# Patient Record
Sex: MALE | Race: WHITE | Employment: OTHER | URBAN - METROPOLITAN AREA
[De-identification: names, ages, dates, MRNs, and addresses within clinical notes are randomized per-mention and may not be internally consistent; named-entity substitution may affect disease eponyms.]

---

## 2018-09-14 ENCOUNTER — APPOINTMENT (OUTPATIENT)
Dept: GENERAL RADIOLOGY | Facility: HOSPITAL | Age: 58
DRG: 195 | End: 2018-09-14
Attending: EMERGENCY MEDICINE

## 2018-09-14 ENCOUNTER — HOSPITAL ENCOUNTER (INPATIENT)
Facility: HOSPITAL | Age: 58
LOS: 2 days | Discharge: HOME | DRG: 195 | End: 2018-09-16
Attending: EMERGENCY MEDICINE | Admitting: HOSPITALIST

## 2018-09-14 DIAGNOSIS — J18.9 COMMUNITY ACQUIRED PNEUMONIA OF LEFT LOWER LOBE OF LUNG: Primary | ICD-10-CM

## 2018-09-14 LAB
ALBUMIN SERPL-MCNC: 3.3 G/DL (ref 3.5–4.8)
ALBUMIN/GLOB SERPL: 0.9 {RATIO} (ref 1–2)
ALP LIVER SERPL-CCNC: 30 U/L (ref 45–117)
ALT SERPL-CCNC: 87 U/L (ref 17–63)
ANION GAP SERPL CALC-SCNC: 8 MMOL/L (ref 0–18)
AST SERPL-CCNC: 28 U/L (ref 15–41)
BASOPHILS # BLD AUTO: 0.03 X10(3) UL (ref 0–0.1)
BASOPHILS NFR BLD AUTO: 0.4 %
BILIRUB SERPL-MCNC: 0.4 MG/DL (ref 0.1–2)
BUN BLD-MCNC: 15 MG/DL (ref 8–20)
BUN/CREAT SERPL: 15.6 (ref 10–20)
CALCIUM BLD-MCNC: 8.7 MG/DL (ref 8.3–10.3)
CHLORIDE SERPL-SCNC: 106 MMOL/L (ref 101–111)
CO2 SERPL-SCNC: 25 MMOL/L (ref 22–32)
CREAT BLD-MCNC: 0.96 MG/DL (ref 0.7–1.3)
EOSINOPHIL # BLD AUTO: 0.07 X10(3) UL (ref 0–0.3)
EOSINOPHIL NFR BLD AUTO: 0.9 %
ERYTHROCYTE [DISTWIDTH] IN BLOOD BY AUTOMATED COUNT: 13.8 % (ref 11.5–16)
GLOBULIN PLAS-MCNC: 3.8 G/DL (ref 2.5–4)
GLUCOSE BLD-MCNC: 177 MG/DL (ref 70–99)
HCT VFR BLD AUTO: 43.1 % (ref 37–53)
HGB BLD-MCNC: 14.5 G/DL (ref 13–17)
IMMATURE GRANULOCYTE COUNT: 0.07 X10(3) UL (ref 0–1)
IMMATURE GRANULOCYTE RATIO %: 0.9 %
LACTIC ACID: 2.7 MMOL/L (ref 0.5–2)
LYMPHOCYTES # BLD AUTO: 2.25 X10(3) UL (ref 0.9–4)
LYMPHOCYTES NFR BLD AUTO: 29.1 %
M PROTEIN MFR SERPL ELPH: 7.1 G/DL (ref 6.1–8.3)
MCH RBC QN AUTO: 30.6 PG (ref 27–33.2)
MCHC RBC AUTO-ENTMCNC: 33.6 G/DL (ref 31–37)
MCV RBC AUTO: 90.9 FL (ref 80–99)
MONOCYTES # BLD AUTO: 1.04 X10(3) UL (ref 0.1–1)
MONOCYTES NFR BLD AUTO: 13.5 %
NEUTROPHIL ABS PRELIM: 4.26 X10 (3) UL (ref 1.3–6.7)
NEUTROPHILS # BLD AUTO: 4.26 X10(3) UL (ref 1.3–6.7)
NEUTROPHILS NFR BLD AUTO: 55.2 %
OSMOLALITY SERPL CALC.SUM OF ELEC: 293 MOSM/KG (ref 275–295)
PLATELET # BLD AUTO: 222 10(3)UL (ref 150–450)
POTASSIUM SERPL-SCNC: 4.2 MMOL/L (ref 3.6–5.1)
RBC # BLD AUTO: 4.74 X10(6)UL (ref 4.3–5.7)
RED CELL DISTRIBUTION WIDTH-SD: 45.8 FL (ref 35.1–46.3)
SODIUM SERPL-SCNC: 139 MMOL/L (ref 136–144)
WBC # BLD AUTO: 7.7 X10(3) UL (ref 4–13)

## 2018-09-14 PROCEDURE — 99223 1ST HOSP IP/OBS HIGH 75: CPT | Performed by: INTERNAL MEDICINE

## 2018-09-14 PROCEDURE — 71045 X-RAY EXAM CHEST 1 VIEW: CPT | Performed by: EMERGENCY MEDICINE

## 2018-09-14 RX ORDER — SODIUM CHLORIDE 9 MG/ML
INJECTION, SOLUTION INTRAVENOUS CONTINUOUS
Status: DISCONTINUED | OUTPATIENT
Start: 2018-09-14 | End: 2018-09-15

## 2018-09-14 RX ORDER — PREDNISONE 10 MG/1
10 TABLET ORAL DAILY
Status: ON HOLD | COMMUNITY
Start: 2018-09-12 | End: 2018-09-16

## 2018-09-14 RX ORDER — ACETAMINOPHEN 325 MG/1
650 TABLET ORAL EVERY 6 HOURS PRN
Status: DISCONTINUED | OUTPATIENT
Start: 2018-09-14 | End: 2018-09-16

## 2018-09-14 RX ORDER — METOCLOPRAMIDE HYDROCHLORIDE 5 MG/ML
10 INJECTION INTRAMUSCULAR; INTRAVENOUS EVERY 8 HOURS PRN
Status: DISCONTINUED | OUTPATIENT
Start: 2018-09-14 | End: 2018-09-16

## 2018-09-14 RX ORDER — HEPARIN SODIUM 5000 [USP'U]/ML
5000 INJECTION, SOLUTION INTRAVENOUS; SUBCUTANEOUS EVERY 8 HOURS SCHEDULED
Status: DISCONTINUED | OUTPATIENT
Start: 2018-09-14 | End: 2018-09-16

## 2018-09-14 RX ORDER — ONDANSETRON 2 MG/ML
4 INJECTION INTRAMUSCULAR; INTRAVENOUS EVERY 6 HOURS PRN
Status: DISCONTINUED | OUTPATIENT
Start: 2018-09-14 | End: 2018-09-16

## 2018-09-15 LAB
ANION GAP SERPL CALC-SCNC: 4 MMOL/L (ref 0–18)
ATRIAL RATE: 82 BPM
BASOPHILS # BLD AUTO: 0.03 X10(3) UL (ref 0–0.1)
BASOPHILS NFR BLD AUTO: 0.5 %
BILIRUB UR QL STRIP.AUTO: NEGATIVE
BUN BLD-MCNC: 13 MG/DL (ref 8–20)
BUN/CREAT SERPL: 16.7 (ref 10–20)
CALCIUM BLD-MCNC: 7.5 MG/DL (ref 8.3–10.3)
CHLORIDE SERPL-SCNC: 113 MMOL/L (ref 101–111)
CLARITY UR REFRACT.AUTO: CLEAR
CO2 SERPL-SCNC: 27 MMOL/L (ref 22–32)
CREAT BLD-MCNC: 0.78 MG/DL (ref 0.7–1.3)
EOSINOPHIL # BLD AUTO: 0.11 X10(3) UL (ref 0–0.3)
EOSINOPHIL NFR BLD AUTO: 1.8 %
ERYTHROCYTE [DISTWIDTH] IN BLOOD BY AUTOMATED COUNT: 13.7 % (ref 11.5–16)
GLUCOSE BLD-MCNC: 93 MG/DL (ref 70–99)
GLUCOSE UR STRIP.AUTO-MCNC: NEGATIVE MG/DL
HCT VFR BLD AUTO: 38.2 % (ref 37–53)
HGB BLD-MCNC: 12.5 G/DL (ref 13–17)
IMMATURE GRANULOCYTE COUNT: 0.06 X10(3) UL (ref 0–1)
IMMATURE GRANULOCYTE RATIO %: 1 %
KETONES UR STRIP.AUTO-MCNC: NEGATIVE MG/DL
LACTIC ACID: 1.7 MMOL/L (ref 0.5–2)
LACTIC ACID: 1.8 MMOL/L (ref 0.5–2)
LEUKOCYTE ESTERASE UR QL STRIP.AUTO: NEGATIVE
LYMPHOCYTES # BLD AUTO: 2.17 X10(3) UL (ref 0.9–4)
LYMPHOCYTES NFR BLD AUTO: 35.7 %
MCH RBC QN AUTO: 30.3 PG (ref 27–33.2)
MCHC RBC AUTO-ENTMCNC: 32.7 G/DL (ref 31–37)
MCV RBC AUTO: 92.5 FL (ref 80–99)
MONOCYTES # BLD AUTO: 0.73 X10(3) UL (ref 0.1–1)
MONOCYTES NFR BLD AUTO: 12 %
NEUTROPHIL ABS PRELIM: 2.97 X10 (3) UL (ref 1.3–6.7)
NEUTROPHILS # BLD AUTO: 2.97 X10(3) UL (ref 1.3–6.7)
NEUTROPHILS NFR BLD AUTO: 49 %
NITRITE UR QL STRIP.AUTO: NEGATIVE
OSMOLALITY SERPL CALC.SUM OF ELEC: 298 MOSM/KG (ref 275–295)
P AXIS: 24 DEGREES
P-R INTERVAL: 128 MS
PH UR STRIP.AUTO: 5 [PH] (ref 4.5–8)
PLATELET # BLD AUTO: 173 10(3)UL (ref 150–450)
POTASSIUM SERPL-SCNC: 3.9 MMOL/L (ref 3.6–5.1)
PROT UR STRIP.AUTO-MCNC: NEGATIVE MG/DL
Q-T INTERVAL: 352 MS
QRS DURATION: 68 MS
QTC CALCULATION (BEZET): 411 MS
R AXIS: 11 DEGREES
RBC # BLD AUTO: 4.13 X10(6)UL (ref 4.3–5.7)
RBC UR QL AUTO: NEGATIVE
RED CELL DISTRIBUTION WIDTH-SD: 46.5 FL (ref 35.1–46.3)
SODIUM SERPL-SCNC: 144 MMOL/L (ref 136–144)
SP GR UR STRIP.AUTO: 1.01 (ref 1–1.03)
T AXIS: 28 DEGREES
UROBILINOGEN UR STRIP.AUTO-MCNC: <2 MG/DL
VENTRICULAR RATE: 82 BPM
WBC # BLD AUTO: 6.1 X10(3) UL (ref 4–13)

## 2018-09-15 PROCEDURE — 99232 SBSQ HOSP IP/OBS MODERATE 35: CPT | Performed by: STUDENT IN AN ORGANIZED HEALTH CARE EDUCATION/TRAINING PROGRAM

## 2018-09-15 RX ORDER — SODIUM CHLORIDE 9 MG/ML
INJECTION, SOLUTION INTRAVENOUS CONTINUOUS
Status: DISCONTINUED | OUTPATIENT
Start: 2018-09-15 | End: 2018-09-16

## 2018-09-15 NOTE — PROGRESS NOTES
TOM HOSPITALIST  Progress Note     Roe Meyer Patient Status:  Inpatient    10/13/1960 MRN [id number]   [facility] 5NW-A Attending Marko Dykes MD   Hosp Day # 1 PCP None Pcp     Chief Complaint: PNA    S: Patient  Has dry cough, CT- discussed w /pt, recommend repeat CT as OP in 2-4 weeks after resolution of PNA for better visualization     Plan of care:   Continue abx  Possible d/c in AM     Quality:  · DVT Prophylaxis: Heparin   · CODE status: full  · Zuniga: no  · Central line: n

## 2018-09-15 NOTE — H&P
TOM HOSPITALIST  History and Physical     Deyanira Bui Patient Status:  Emergency    10/13/1960 MRN [id number]   Location [address] [facility] Attending Karrin Lefort, MD   Hosp Day # 0 PCP None Pcp     Chief Complaint: PNA murmurs, rubs or gallops. Equal pulses. Chest and Back: No tenderness or deformity. Abdomen: Soft, nontender, nondistended. Positive bowel sounds. No rebound, guarding or organomegaly. Neurologic: No focal neurological deficits.  CNII-XII grossly intac

## 2018-09-15 NOTE — ED INITIAL ASSESSMENT (HPI)
Pt to ED with c/o pneumonia x 1 month. Pt had CT scan on Tuesday and was instructed to go to the ER today by his doctor.

## 2018-09-15 NOTE — PROGRESS NOTES
NURSING ADMISSION NOTE      Patient admitted via Cart  Oriented to room 525. Safety precautions initiated. Bed in low position. Call light in reach.

## 2018-09-15 NOTE — ED PROVIDER NOTES
Patient Seen in: [facility] Emergency Department    History   Patient presents with:  Pneumonia    Stated Complaint: diagnosed with pneumonia at immediate care - instructed to come to the ED for a*    HPI    Patient is a 28-year-old male comes emerg 09/14/18 2104 (!) 145/91   Pulse 09/14/18 2104 81   Resp 09/14/18 2104 22   Temp 09/14/18 2104 97.7 °F (36.5 °C)   Temp src 09/14/18 2301 Oral   SpO2 09/14/18 2104 97 %   O2 Device 09/14/18 2104 None (Room air)       Current:/81 (BP Location: Left ar PLATELET.   Procedure                               Abnormality         Status                     ---------                               -----------         ------                     CBC W/ DIFFERENTIAL[[id number]]          Abnormal            Final resul CONCLUSION:  1. Stable findings when compared to  image from CT of the chest performed at outside institution dated 9/11/2018. This report was communicated by telephone to ED MD, Dr. Toña Joaquin at the dictation time shown below.       Dictated by: [place]

## 2018-09-16 VITALS
HEART RATE: 63 BPM | HEIGHT: 64 IN | TEMPERATURE: 99 F | BODY MASS INDEX: 30.73 KG/M2 | SYSTOLIC BLOOD PRESSURE: 110 MMHG | DIASTOLIC BLOOD PRESSURE: 53 MMHG | RESPIRATION RATE: 18 BRPM | OXYGEN SATURATION: 97 % | WEIGHT: 180 LBS

## 2018-09-16 LAB
BASOPHILS # BLD AUTO: 0.05 X10(3) UL (ref 0–0.1)
BASOPHILS NFR BLD AUTO: 0.8 %
EOSINOPHIL # BLD AUTO: 0.18 X10(3) UL (ref 0–0.3)
EOSINOPHIL NFR BLD AUTO: 2.9 %
ERYTHROCYTE [DISTWIDTH] IN BLOOD BY AUTOMATED COUNT: 13.6 % (ref 11.5–16)
HCT VFR BLD AUTO: 38.4 % (ref 37–53)
HGB BLD-MCNC: 12.7 G/DL (ref 13–17)
IMMATURE GRANULOCYTE COUNT: 0.05 X10(3) UL (ref 0–1)
IMMATURE GRANULOCYTE RATIO %: 0.8 %
LYMPHOCYTES # BLD AUTO: 1.59 X10(3) UL (ref 0.9–4)
LYMPHOCYTES NFR BLD AUTO: 25.9 %
MCH RBC QN AUTO: 30.5 PG (ref 27–33.2)
MCHC RBC AUTO-ENTMCNC: 33.1 G/DL (ref 31–37)
MCV RBC AUTO: 92.3 FL (ref 80–99)
MONOCYTES # BLD AUTO: 0.77 X10(3) UL (ref 0.1–1)
MONOCYTES NFR BLD AUTO: 12.5 %
NEUTROPHIL ABS PRELIM: 3.5 X10 (3) UL (ref 1.3–6.7)
NEUTROPHILS # BLD AUTO: 3.5 X10(3) UL (ref 1.3–6.7)
NEUTROPHILS NFR BLD AUTO: 57.1 %
PLATELET # BLD AUTO: 168 10(3)UL (ref 150–450)
RBC # BLD AUTO: 4.16 X10(6)UL (ref 4.3–5.7)
RED CELL DISTRIBUTION WIDTH-SD: 46.5 FL (ref 35.1–46.3)
WBC # BLD AUTO: 6.1 X10(3) UL (ref 4–13)

## 2018-09-16 PROCEDURE — 99238 HOSP IP/OBS DSCHRG MGMT 30/<: CPT | Performed by: STUDENT IN AN ORGANIZED HEALTH CARE EDUCATION/TRAINING PROGRAM

## 2018-09-16 RX ORDER — AMOXICILLIN AND CLAVULANATE POTASSIUM 875; 125 MG/1; MG/1
1 TABLET, FILM COATED ORAL 2 TIMES DAILY
Qty: 10 TABLET | Refills: 0 | Status: SHIPPED | OUTPATIENT
Start: 2018-09-16 | End: 2018-09-21

## 2018-09-16 NOTE — PLAN OF CARE
Patient/Family Goals    • Patient/Family Long Term Goal Progressing    • Patient/Family Short Term Goal Progressing        Pt alert and oriented. O2 is WNL on ra. No c/o pain. IV abx infusing. Refusing heparin. Up ad aicha.   Will continue with plan of ca

## 2018-09-16 NOTE — PLAN OF CARE
Patient/Family Short Term Goal Progressing    Assumed care at 0000. Resting in bed apparently sleeping. Walked in the halls x 1. Denies pain. IV infusing with intermittent antibiotic as ordered see MAR. Discussed plan of care. 0600 Refusing heparin shot.  C

## 2018-09-16 NOTE — PLAN OF CARE
Patient/Family Goals    • Patient/Family Long Term Goal Adequate for Discharge    • Patient/Family Short Term Goal Adequate for Discharge

## 2018-09-16 NOTE — DISCHARGE SUMMARY
[facility] HOSPITALIST  DISCHARGE SUMMARY     Alex Diallo Patient Status:  Inpatient    10/13/1960 MRN [id number]   [facility] 5NW-A Attending Earlene Polk MD   [facility] Day # 2 PCP None Pcp     Date of Admission: 2018  Date of Discharge 10 tablet  Refills:  0        STOP taking these medications    predniSONE 10 MG Tabs  Commonly known as:  Aaron Dykes              Where to Get Your Medications      Please  your prescriptions at the location directed by your doctor or nurse    Bring

## 2018-09-24 ENCOUNTER — OFFICE VISIT (OUTPATIENT)
Dept: FAMILY MEDICINE CLINIC | Facility: CLINIC | Age: 58
End: 2018-09-24

## 2018-09-24 VITALS
HEART RATE: 98 BPM | SYSTOLIC BLOOD PRESSURE: 124 MMHG | WEIGHT: 186 LBS | DIASTOLIC BLOOD PRESSURE: 72 MMHG | TEMPERATURE: 99 F | HEIGHT: 64.17 IN | OXYGEN SATURATION: 95 % | BODY MASS INDEX: 31.76 KG/M2

## 2018-09-24 DIAGNOSIS — J18.9 COMMUNITY ACQUIRED PNEUMONIA OF LEFT LOWER LOBE OF LUNG: Primary | ICD-10-CM

## 2018-09-24 PROCEDURE — 99203 OFFICE O/P NEW LOW 30 MIN: CPT | Performed by: FAMILY MEDICINE

## 2018-09-24 NOTE — PATIENT INSTRUCTIONS
-- lungs sound good  -- continue probiotics for at least 3-4 wks  -- would expect slow continued improvement  -- repeat CT scan in 3-4 wks  -- followup here after that, sooner if not improving or worsening

## 2018-09-24 NOTE — PROGRESS NOTES
Oren Chicas is a 59year old male here for Patient presents with:  Hospital F/U: [facility] for Pneumonia 09/14/2018. The patient feels foggy       HPI:       1.  Community acquired pneumonia of left lower lobe of lung ([address]  -here for hospital fol of left lower lobe of lung (HCC)/ ? Lung mass  -improving  -lungs clear today  -continue probiotics  -repeat CT CHEST (CPT=71250); Future in 3-4 wks  -f/u here after that          The patient is asked to return in 1 month.     Orders This Visit:  No orders

## 2018-10-12 ENCOUNTER — OFFICE VISIT (OUTPATIENT)
Dept: FAMILY MEDICINE CLINIC | Facility: CLINIC | Age: 58
End: 2018-10-12

## 2018-10-12 VITALS
TEMPERATURE: 98 F | OXYGEN SATURATION: 98 % | HEART RATE: 79 BPM | BODY MASS INDEX: 32.27 KG/M2 | WEIGHT: 189 LBS | SYSTOLIC BLOOD PRESSURE: 122 MMHG | DIASTOLIC BLOOD PRESSURE: 76 MMHG | HEIGHT: 64.17 IN

## 2018-10-12 DIAGNOSIS — J18.9 COMMUNITY ACQUIRED PNEUMONIA OF LEFT LOWER LOBE OF LUNG: Primary | ICD-10-CM

## 2018-10-12 PROCEDURE — 99213 OFFICE O/P EST LOW 20 MIN: CPT | Performed by: FAMILY MEDICINE

## 2018-10-12 NOTE — PATIENT INSTRUCTIONS
-- call if symptoms worsening  -- otherwise, plan to repeat CT scan 1-2 wks before eligibility period is over for new insurance  -- followup as needed

## 2018-10-21 NOTE — PROGRESS NOTES
Roe Meyer is a 59year old male here for Patient presents with:  Test Results: room 1 MM      HPI:       1.  Community acquired pneumonia of left lower lobe of lung ([facility] [address]  -reviewed results of recent CT scan  -consolidation has decreased in size by ab more time to see resolution on imaging  -as clinically continues to improve  -low threshold to restart abx treatment if develops worsening symptoms - reviewed warning signs  -recehck CT in 2 months  -followup after that          The patient is asked to ret

## 2023-09-05 ENCOUNTER — APPOINTMENT (OUTPATIENT)
Dept: GENERAL RADIOLOGY | Facility: HOSPITAL | Age: 63
End: 2023-09-05

## 2023-09-05 ENCOUNTER — HOSPITAL ENCOUNTER (EMERGENCY)
Facility: HOSPITAL | Age: 63
Discharge: HOME | End: 2023-09-06
Attending: EMERGENCY MEDICINE

## 2023-09-05 DIAGNOSIS — I10 HYPERTENSION, UNSPECIFIED TYPE: Primary | ICD-10-CM

## 2023-09-05 LAB
ALBUMIN SERPL-MCNC: 3.9 G/DL (ref 3.4–5)
ALBUMIN/GLOB SERPL: 0.9 {RATIO} (ref 1–2)
ALP LIVER SERPL-CCNC: 43 U/L
ALT SERPL-CCNC: 31 U/L
ANION GAP SERPL CALC-SCNC: 8 MMOL/L (ref 0–18)
AST SERPL-CCNC: 17 U/L (ref 15–37)
BASOPHILS # BLD AUTO: 0.05 X10(3) UL (ref 0–0.2)
BASOPHILS NFR BLD AUTO: 0.5 %
BILIRUB SERPL-MCNC: 0.3 MG/DL (ref 0.1–2)
BUN BLD-MCNC: 13 MG/DL (ref 7–18)
CALCIUM BLD-MCNC: 8.9 MG/DL (ref 8.5–10.1)
CHLORIDE SERPL-SCNC: 107 MMOL/L (ref 98–112)
CO2 SERPL-SCNC: 25 MMOL/L (ref 21–32)
CREAT BLD-MCNC: 1.06 MG/DL
EGFRCR SERPLBLD CKD-EPI 2021: 78 ML/MIN/1.73M2 (ref 60–?)
EOSINOPHIL # BLD AUTO: 0.11 X10(3) UL (ref 0–0.7)
EOSINOPHIL NFR BLD AUTO: 1.2 %
ERYTHROCYTE [DISTWIDTH] IN BLOOD BY AUTOMATED COUNT: 12.3 %
GLOBULIN PLAS-MCNC: 4.2 G/DL (ref 2.8–4.4)
GLUCOSE BLD-MCNC: 158 MG/DL (ref 70–99)
HCT VFR BLD AUTO: 47.6 %
HGB BLD-MCNC: 16 G/DL
IMM GRANULOCYTES # BLD AUTO: 0.04 X10(3) UL (ref 0–1)
IMM GRANULOCYTES NFR BLD: 0.4 %
LYMPHOCYTES # BLD AUTO: 1.03 X10(3) UL (ref 1–4)
LYMPHOCYTES NFR BLD AUTO: 10.9 %
MCH RBC QN AUTO: 30.7 PG (ref 26–34)
MCHC RBC AUTO-ENTMCNC: 33.6 G/DL (ref 31–37)
MCV RBC AUTO: 91.4 FL
MONOCYTES # BLD AUTO: 0.87 X10(3) UL (ref 0.1–1)
MONOCYTES NFR BLD AUTO: 9.2 %
NEUTROPHILS # BLD AUTO: 7.34 X10 (3) UL (ref 1.5–7.7)
NEUTROPHILS # BLD AUTO: 7.34 X10(3) UL (ref 1.5–7.7)
NEUTROPHILS NFR BLD AUTO: 77.8 %
OSMOLALITY SERPL CALC.SUM OF ELEC: 293 MOSM/KG (ref 275–295)
PLATELET # BLD AUTO: 272 10(3)UL (ref 150–450)
POTASSIUM SERPL-SCNC: 4.1 MMOL/L (ref 3.5–5.1)
PROT SERPL-MCNC: 8.1 G/DL (ref 6.4–8.2)
RBC # BLD AUTO: 5.21 X10(6)UL
SODIUM SERPL-SCNC: 140 MMOL/L (ref 136–145)
TROPONIN I HIGH SENSITIVITY: 4 NG/L
WBC # BLD AUTO: 9.4 X10(3) UL (ref 4–11)

## 2023-09-05 PROCEDURE — 36415 COLL VENOUS BLD VENIPUNCTURE: CPT

## 2023-09-05 PROCEDURE — 80053 COMPREHEN METABOLIC PANEL: CPT

## 2023-09-05 PROCEDURE — 80053 COMPREHEN METABOLIC PANEL: CPT | Performed by: EMERGENCY MEDICINE

## 2023-09-05 PROCEDURE — 84484 ASSAY OF TROPONIN QUANT: CPT

## 2023-09-05 PROCEDURE — 93010 ELECTROCARDIOGRAM REPORT: CPT

## 2023-09-05 PROCEDURE — 85025 COMPLETE CBC W/AUTO DIFF WBC: CPT | Performed by: EMERGENCY MEDICINE

## 2023-09-05 PROCEDURE — 99285 EMERGENCY DEPT VISIT HI MDM: CPT

## 2023-09-05 PROCEDURE — 99284 EMERGENCY DEPT VISIT MOD MDM: CPT

## 2023-09-05 PROCEDURE — 93005 ELECTROCARDIOGRAM TRACING: CPT

## 2023-09-05 PROCEDURE — 85025 COMPLETE CBC W/AUTO DIFF WBC: CPT

## 2023-09-05 PROCEDURE — 71045 X-RAY EXAM CHEST 1 VIEW: CPT | Performed by: EMERGENCY MEDICINE

## 2023-09-05 PROCEDURE — 84484 ASSAY OF TROPONIN QUANT: CPT | Performed by: EMERGENCY MEDICINE

## 2023-09-05 RX ORDER — AMLODIPINE BESYLATE 5 MG/1
5 TABLET ORAL ONCE
Status: DISCONTINUED | OUTPATIENT
Start: 2023-09-06 | End: 2023-09-05

## 2023-09-05 RX ORDER — AMLODIPINE BESYLATE 5 MG/1
5 TABLET ORAL ONCE
Status: COMPLETED | OUTPATIENT
Start: 2023-09-05 | End: 2023-09-05

## 2023-09-06 VITALS
WEIGHT: 190 LBS | OXYGEN SATURATION: 96 % | HEART RATE: 76 BPM | TEMPERATURE: 99 F | HEIGHT: 64 IN | BODY MASS INDEX: 32.44 KG/M2 | DIASTOLIC BLOOD PRESSURE: 82 MMHG | SYSTOLIC BLOOD PRESSURE: 163 MMHG | RESPIRATION RATE: 20 BRPM

## 2023-09-06 LAB
ATRIAL RATE: 78 BPM
P AXIS: 5 DEGREES
P-R INTERVAL: 136 MS
Q-T INTERVAL: 358 MS
QRS DURATION: 72 MS
QTC CALCULATION (BEZET): 408 MS
R AXIS: 5 DEGREES
T AXIS: 45 DEGREES
VENTRICULAR RATE: 78 BPM

## 2023-09-06 RX ORDER — AMLODIPINE BESYLATE 5 MG/1
5 TABLET ORAL DAILY
Qty: 30 TABLET | Refills: 2 | Status: SHIPPED | OUTPATIENT
Start: 2023-09-06 | End: 2023-12-05

## 2023-09-06 RX ORDER — HYDRALAZINE HYDROCHLORIDE 20 MG/ML
5 INJECTION INTRAMUSCULAR; INTRAVENOUS ONCE
Status: DISCONTINUED | OUTPATIENT
Start: 2023-09-06 | End: 2023-09-06

## 2023-09-06 NOTE — ED INITIAL ASSESSMENT (HPI)
Patient seen at [address] [place] [address] for dizziness and CP. Sent in due to HTN.
Satisfactory

## 2023-09-14 ENCOUNTER — HOSPITAL ENCOUNTER (OUTPATIENT)
Age: 63
Discharge: HOME | End: 2023-09-14
Attending: EMERGENCY MEDICINE

## 2023-09-14 VITALS
HEART RATE: 100 BPM | HEIGHT: 64 IN | OXYGEN SATURATION: 99 % | BODY MASS INDEX: 32.44 KG/M2 | RESPIRATION RATE: 18 BRPM | TEMPERATURE: 98 F | WEIGHT: 190 LBS | DIASTOLIC BLOOD PRESSURE: 70 MMHG | SYSTOLIC BLOOD PRESSURE: 180 MMHG

## 2023-09-14 DIAGNOSIS — F41.9 ANXIETY: ICD-10-CM

## 2023-09-14 DIAGNOSIS — R03.0 ELEVATED BLOOD PRESSURE READING: Primary | ICD-10-CM

## 2023-09-14 PROCEDURE — 99213 OFFICE O/P EST LOW 20 MIN: CPT

## 2023-09-14 PROCEDURE — 99214 OFFICE O/P EST MOD 30 MIN: CPT

## 2023-09-14 RX ORDER — LORAZEPAM 0.5 MG/1
0.5 TABLET ORAL 2 TIMES DAILY PRN
Qty: 10 TABLET | Refills: 0 | Status: SHIPPED | OUTPATIENT
Start: 2023-09-14

## 2023-09-14 NOTE — ED INITIAL ASSESSMENT (HPI)
Patient c/o elevated blood pressure. Prescribed medication in the er 1 week ago but c/o side affects so he stopped taking them. Patient states he does not have a PCP. Denies headache. Patient also states he is having anxiety.

## 2023-11-06 ENCOUNTER — APPOINTMENT (OUTPATIENT)
Dept: GENERAL RADIOLOGY | Facility: HOSPITAL | Age: 63
End: 2023-11-06
Attending: EMERGENCY MEDICINE
Payer: MEDICARE

## 2023-11-06 ENCOUNTER — HOSPITAL ENCOUNTER (OUTPATIENT)
Facility: HOSPITAL | Age: 63
Setting detail: OBSERVATION
Discharge: HOME | End: 2023-11-07
Attending: EMERGENCY MEDICINE | Admitting: INTERNAL MEDICINE
Payer: MEDICARE

## 2023-11-06 DIAGNOSIS — I15.8 OTHER SECONDARY HYPERTENSION: ICD-10-CM

## 2023-11-06 DIAGNOSIS — R07.9 CHEST PAIN WITH MODERATE RISK FOR CARDIAC ETIOLOGY: Primary | ICD-10-CM

## 2023-11-06 DIAGNOSIS — I16.0 HYPERTENSIVE URGENCY: ICD-10-CM

## 2023-11-06 PROBLEM — F41.9 ANXIETY AND DEPRESSION: Status: ACTIVE | Noted: 2023-11-06

## 2023-11-06 PROBLEM — R73.9 HYPERGLYCEMIA: Status: ACTIVE | Noted: 2023-11-06

## 2023-11-06 PROBLEM — R73.03 PREDIABETES: Status: ACTIVE | Noted: 2023-11-06

## 2023-11-06 PROBLEM — F32.A ANXIETY AND DEPRESSION: Status: ACTIVE | Noted: 2023-11-06

## 2023-11-06 LAB
ALBUMIN SERPL-MCNC: 3.8 G/DL (ref 3.4–5)
ALBUMIN/GLOB SERPL: 0.9 {RATIO} (ref 1–2)
ALP LIVER SERPL-CCNC: 36 U/L
ALT SERPL-CCNC: 28 U/L
ANION GAP SERPL CALC-SCNC: 8 MMOL/L (ref 0–18)
AST SERPL-CCNC: 15 U/L (ref 15–37)
BASOPHILS # BLD AUTO: 0.03 X10(3) UL (ref 0–0.2)
BASOPHILS NFR BLD AUTO: 0.4 %
BILIRUB SERPL-MCNC: 0.5 MG/DL (ref 0.1–2)
BUN BLD-MCNC: 12 MG/DL (ref 9–23)
CALCIUM BLD-MCNC: 9.3 MG/DL (ref 8.5–10.1)
CHLORIDE SERPL-SCNC: 107 MMOL/L (ref 98–112)
CO2 SERPL-SCNC: 25 MMOL/L (ref 21–32)
CREAT BLD-MCNC: 1.18 MG/DL
EGFRCR SERPLBLD CKD-EPI 2021: 68 ML/MIN/1.73M2 (ref 60–?)
EOSINOPHIL # BLD AUTO: 0.02 X10(3) UL (ref 0–0.7)
EOSINOPHIL NFR BLD AUTO: 0.3 %
ERYTHROCYTE [DISTWIDTH] IN BLOOD BY AUTOMATED COUNT: 12 %
EST. AVERAGE GLUCOSE BLD GHB EST-MCNC: 134 MG/DL (ref 68–126)
GLOBULIN PLAS-MCNC: 4.3 G/DL (ref 2.8–4.4)
GLUCOSE BLD-MCNC: 183 MG/DL (ref 70–99)
HBA1C MFR BLD: 6.3 % (ref ?–5.7)
HCT VFR BLD AUTO: 46.4 %
HGB BLD-MCNC: 15.5 G/DL
IMM GRANULOCYTES # BLD AUTO: 0.02 X10(3) UL (ref 0–1)
IMM GRANULOCYTES NFR BLD: 0.3 %
LACTATE SERPL-SCNC: 1.3 MMOL/L (ref 0.4–2)
LYMPHOCYTES # BLD AUTO: 1.04 X10(3) UL (ref 1–4)
LYMPHOCYTES NFR BLD AUTO: 15.1 %
MCH RBC QN AUTO: 30.2 PG (ref 26–34)
MCHC RBC AUTO-ENTMCNC: 33.4 G/DL (ref 31–37)
MCV RBC AUTO: 90.3 FL
MONOCYTES # BLD AUTO: 0.59 X10(3) UL (ref 0.1–1)
MONOCYTES NFR BLD AUTO: 8.6 %
NEUTROPHILS # BLD AUTO: 5.2 X10 (3) UL (ref 1.5–7.7)
NEUTROPHILS # BLD AUTO: 5.2 X10(3) UL (ref 1.5–7.7)
NEUTROPHILS NFR BLD AUTO: 75.3 %
OSMOLALITY SERPL CALC.SUM OF ELEC: 294 MOSM/KG (ref 275–295)
PLATELET # BLD AUTO: 281 10(3)UL (ref 150–450)
POTASSIUM SERPL-SCNC: 3.7 MMOL/L (ref 3.5–5.1)
PROCALCITONIN SERPL-MCNC: <0.05 NG/ML (ref ?–0.16)
PROT SERPL-MCNC: 8.1 G/DL (ref 6.4–8.2)
RBC # BLD AUTO: 5.14 X10(6)UL
SODIUM SERPL-SCNC: 140 MMOL/L (ref 136–145)
TROPONIN I SERPL HS-MCNC: 4 NG/L
TROPONIN I SERPL HS-MCNC: 6 NG/L
TROPONIN I SERPL HS-MCNC: 7 NG/L
WBC # BLD AUTO: 6.9 X10(3) UL (ref 4–11)

## 2023-11-06 PROCEDURE — 71045 X-RAY EXAM CHEST 1 VIEW: CPT | Performed by: EMERGENCY MEDICINE

## 2023-11-06 PROCEDURE — 99223 1ST HOSP IP/OBS HIGH 75: CPT | Performed by: INTERNAL MEDICINE

## 2023-11-06 RX ORDER — ASPIRIN 325 MG
325 TABLET ORAL DAILY
Status: DISCONTINUED | OUTPATIENT
Start: 2023-11-07 | End: 2023-11-07

## 2023-11-06 RX ORDER — ASPIRIN 81 MG/1
324 TABLET, CHEWABLE ORAL ONCE
Status: COMPLETED | OUTPATIENT
Start: 2023-11-06 | End: 2023-11-06

## 2023-11-06 RX ORDER — POTASSIUM CHLORIDE 20 MEQ/1
40 TABLET, EXTENDED RELEASE ORAL ONCE
Status: COMPLETED | OUTPATIENT
Start: 2023-11-06 | End: 2023-11-06

## 2023-11-06 RX ORDER — MELATONIN
3 NIGHTLY PRN
Status: DISCONTINUED | OUTPATIENT
Start: 2023-11-06 | End: 2023-11-07

## 2023-11-06 RX ORDER — ACETAMINOPHEN 500 MG
500 TABLET ORAL EVERY 4 HOURS PRN
Status: DISCONTINUED | OUTPATIENT
Start: 2023-11-06 | End: 2023-11-07

## 2023-11-06 RX ORDER — ESCITALOPRAM OXALATE 5 MG
1 TABLET ORAL DAILY
COMMUNITY
Start: 2023-10-30

## 2023-11-06 RX ORDER — ONDANSETRON 2 MG/ML
4 INJECTION INTRAMUSCULAR; INTRAVENOUS EVERY 6 HOURS PRN
Status: DISCONTINUED | OUTPATIENT
Start: 2023-11-06 | End: 2023-11-07

## 2023-11-06 RX ORDER — NITROGLYCERIN 0.4 MG/1
0.4 TABLET SUBLINGUAL EVERY 5 MIN PRN
Status: DISCONTINUED | OUTPATIENT
Start: 2023-11-06 | End: 2023-11-07

## 2023-11-06 RX ORDER — ALPRAZOLAM 0.25 MG/1
0.25 TABLET ORAL 3 TIMES DAILY PRN
COMMUNITY
Start: 2023-10-25

## 2023-11-06 RX ORDER — LORAZEPAM 2 MG/ML
0.5 INJECTION INTRAMUSCULAR ONCE
Status: COMPLETED | OUTPATIENT
Start: 2023-11-06 | End: 2023-11-06

## 2023-11-06 RX ORDER — CARVEDILOL 6.25 MG/1
6.25 TABLET ORAL 2 TIMES DAILY WITH MEALS
Status: DISCONTINUED | OUTPATIENT
Start: 2023-11-06 | End: 2023-11-07

## 2023-11-06 RX ORDER — HEPARIN SODIUM 5000 [USP'U]/ML
5000 INJECTION, SOLUTION INTRAVENOUS; SUBCUTANEOUS EVERY 12 HOURS SCHEDULED
Status: DISCONTINUED | OUTPATIENT
Start: 2023-11-06 | End: 2023-11-07

## 2023-11-06 NOTE — ED QUICK NOTES
Orders for admission, patient is aware of plan and ready to go upstairs. Any questions, please call ED RN Berto Coleman at extension [phone].      Patient Covid vaccination status: Unvaccinated     COVID Test Ordered in ED: None    COVID Suspicion at Admission: N/A    Running Infusions:  None    Mental Status/LOC at time of transport: A&Ox4    Other pertinent information:   CIWA score: N/A   NIH score:  N/A

## 2023-11-06 NOTE — ED INITIAL ASSESSMENT (HPI)
Pt arrives to ED with c/o of exhaustion and CP since yesterday. Pt was woken up this morning about 0130, took a Xanax and attempted to go to. Pt denies n/v/d, pt feels like \"something isn't right. \" Pt admits to being on his third anxiety medication.

## 2023-11-06 NOTE — PLAN OF CARE
Received patient from ER. Patient Frank Castillo. NSR/SB on tele. No c/o pain at this time. Skin assessment done with 2 nurses. Admission navigator completed. Continent of bowel and bladder. Plan for 2D Echo. Call light within reach. Plan of care updated. Problem: Patient/Family Goals  Goal: Patient/Family Long Term Goal  Description: Patient's Long Term Goal: Stay healthy    Interventions:  - Medication management  -Dietary management  -Stress/anxiety management  -Prompt follow up with physicians  - See additional Care Plan goals for specific interventions  11/6/2023 1529 by Wayne Lerner RN  Outcome: Progressing  11/6/2023 1524 by Wayne Lerner RN  Outcome: Progressing  Goal: Patient/Family Short Term Goal  Description: Patient's Short Term Goal: Discharge home    Interventions:   - Pain management  -Medication management  -Cardiology consult  -2d Echo    - See additional Care Plan goals for specific interventions  11/6/2023 1529 by Wayne Lerner RN  Outcome: Progressing  11/6/2023 1524 by Wayne Lerner RN  Outcome: Progressing     Problem: CARDIOVASCULAR - ADULT  Goal: Maintains optimal cardiac output and hemodynamic stability  Description: INTERVENTIONS:  - Monitor vital signs, rhythm, and trends  - Monitor for bleeding, hypotension and signs of decreased cardiac output  - Evaluate effectiveness of vasoactive medications to optimize hemodynamic stability  - Monitor arterial and/or venous puncture sites for bleeding and/or hematoma  - Assess quality of pulses, skin color and temperature  - Assess for signs of decreased coronary artery perfusion - ex.  Angina  - Evaluate fluid balance, assess for edema, trend weights  11/6/2023 1529 by Wayne Lerner RN  Outcome: Progressing  11/6/2023 1524 by Wayne Lerner RN  Outcome: Progressing  Goal: Absence of cardiac arrhythmias or at baseline  Description: INTERVENTIONS:  - Continuous cardiac monitoring, monitor vital signs, obtain 12 lead EKG if indicated  - Evaluate effectiveness of antiarrhythmic and heart rate control medications as ordered  - Initiate emergency measures for life threatening arrhythmias  - Monitor electrolytes and administer replacement therapy as ordered  11/6/2023 1529 by Kurt Mendes RN  Outcome: Progressing  11/6/2023 1524 by Kurt Mendes, RN  Outcome: Progressing

## 2023-11-06 NOTE — ED QUICK NOTES
Pt ambulated to bathroom without assistance, gait steady, pt denies chest discomfort while ambulating.

## 2023-11-07 ENCOUNTER — APPOINTMENT (OUTPATIENT)
Dept: CV DIAGNOSTICS | Facility: HOSPITAL | Age: 63
End: 2023-11-07
Attending: INTERNAL MEDICINE
Payer: MEDICARE

## 2023-11-07 VITALS
TEMPERATURE: 98 F | SYSTOLIC BLOOD PRESSURE: 133 MMHG | OXYGEN SATURATION: 98 % | RESPIRATION RATE: 18 BRPM | DIASTOLIC BLOOD PRESSURE: 76 MMHG | WEIGHT: 182 LBS | HEIGHT: 64 IN | HEART RATE: 67 BPM | BODY MASS INDEX: 31.07 KG/M2

## 2023-11-07 LAB
ANION GAP SERPL CALC-SCNC: 5 MMOL/L (ref 0–18)
ATRIAL RATE: 78 BPM
BASOPHILS # BLD AUTO: 0.04 X10(3) UL (ref 0–0.2)
BASOPHILS NFR BLD AUTO: 0.7 %
BUN BLD-MCNC: 11 MG/DL (ref 9–23)
CALCIUM BLD-MCNC: 9.1 MG/DL (ref 8.5–10.1)
CHLORIDE SERPL-SCNC: 109 MMOL/L (ref 98–112)
CHOLEST SERPL-MCNC: 183 MG/DL (ref ?–200)
CO2 SERPL-SCNC: 25 MMOL/L (ref 21–32)
CREAT BLD-MCNC: 1.06 MG/DL
EGFRCR SERPLBLD CKD-EPI 2021: 78 ML/MIN/1.73M2 (ref 60–?)
EOSINOPHIL # BLD AUTO: 0.19 X10(3) UL (ref 0–0.7)
EOSINOPHIL NFR BLD AUTO: 3.2 %
ERYTHROCYTE [DISTWIDTH] IN BLOOD BY AUTOMATED COUNT: 12.4 %
GLUCOSE BLD-MCNC: 120 MG/DL (ref 70–99)
HCT VFR BLD AUTO: 43.1 %
HDLC SERPL-MCNC: 39 MG/DL (ref 40–59)
HGB BLD-MCNC: 14.5 G/DL
IMM GRANULOCYTES # BLD AUTO: 0.03 X10(3) UL (ref 0–1)
IMM GRANULOCYTES NFR BLD: 0.5 %
LDLC SERPL CALC-MCNC: 126 MG/DL (ref ?–100)
LYMPHOCYTES # BLD AUTO: 1.27 X10(3) UL (ref 1–4)
LYMPHOCYTES NFR BLD AUTO: 21.6 %
MAGNESIUM SERPL-MCNC: 2.4 MG/DL (ref 1.6–2.6)
MCH RBC QN AUTO: 30.2 PG (ref 26–34)
MCHC RBC AUTO-ENTMCNC: 33.6 G/DL (ref 31–37)
MCV RBC AUTO: 89.8 FL
MONOCYTES # BLD AUTO: 0.56 X10(3) UL (ref 0.1–1)
MONOCYTES NFR BLD AUTO: 9.5 %
NEUTROPHILS # BLD AUTO: 3.8 X10 (3) UL (ref 1.5–7.7)
NEUTROPHILS # BLD AUTO: 3.8 X10(3) UL (ref 1.5–7.7)
NEUTROPHILS NFR BLD AUTO: 64.5 %
NONHDLC SERPL-MCNC: 144 MG/DL (ref ?–130)
OSMOLALITY SERPL CALC.SUM OF ELEC: 289 MOSM/KG (ref 275–295)
P AXIS: 42 DEGREES
P-R INTERVAL: 146 MS
PLATELET # BLD AUTO: 250 10(3)UL (ref 150–450)
POTASSIUM SERPL-SCNC: 4.3 MMOL/L (ref 3.5–5.1)
POTASSIUM SERPL-SCNC: 4.3 MMOL/L (ref 3.5–5.1)
Q-T INTERVAL: 344 MS
QRS DURATION: 74 MS
QTC CALCULATION (BEZET): 392 MS
R AXIS: -8 DEGREES
RBC # BLD AUTO: 4.8 X10(6)UL
SODIUM SERPL-SCNC: 139 MMOL/L (ref 136–145)
T AXIS: 28 DEGREES
TRIGL SERPL-MCNC: 96 MG/DL (ref 30–149)
TROPONIN I SERPL HS-MCNC: 5 NG/L
VENTRICULAR RATE: 78 BPM
VLDLC SERPL CALC-MCNC: 17 MG/DL (ref 0–30)
WBC # BLD AUTO: 5.9 X10(3) UL (ref 4–11)

## 2023-11-07 PROCEDURE — 93306 TTE W/DOPPLER COMPLETE: CPT | Performed by: INTERNAL MEDICINE

## 2023-11-07 PROCEDURE — 99239 HOSP IP/OBS DSCHRG MGMT >30: CPT | Performed by: INTERNAL MEDICINE

## 2023-11-07 RX ORDER — ESCITALOPRAM OXALATE 5 MG/1
5 TABLET ORAL DAILY
Status: DISCONTINUED | OUTPATIENT
Start: 2023-11-07 | End: 2023-11-07

## 2023-11-07 RX ORDER — ATORVASTATIN CALCIUM 20 MG/1
20 TABLET, FILM COATED ORAL NIGHTLY
Qty: 30 TABLET | Refills: 0 | Status: SHIPPED | OUTPATIENT
Start: 2023-11-07

## 2023-11-07 RX ORDER — CARVEDILOL 6.25 MG/1
6.25 TABLET ORAL 2 TIMES DAILY WITH MEALS
Qty: 60 TABLET | Refills: 0 | Status: SHIPPED | OUTPATIENT
Start: 2023-11-07

## 2023-11-07 RX ORDER — ALPRAZOLAM 0.25 MG/1
0.25 TABLET ORAL 3 TIMES DAILY PRN
Status: DISCONTINUED | OUTPATIENT
Start: 2023-11-07 | End: 2023-11-07

## 2023-11-07 NOTE — PLAN OF CARE
Patient alert and oriented x 4. On room air. sinus on cardiac monitor. Patient denies any chest pain or chest discomfort at this time. Patient voids, last BM 11/6.no acute distress noted, ECHO in am . Plan of care updated, all questions answered. Safety precautions in place. Bed alarm on. Will continue to monitor tele/labs/vital signs closely. Problem: CARDIOVASCULAR - ADULT  Goal: Maintains optimal cardiac output and hemodynamic stability  Description: INTERVENTIONS:  - Monitor vital signs, rhythm, and trends  - Monitor for bleeding, hypotension and signs of decreased cardiac output  - Evaluate effectiveness of vasoactive medications to optimize hemodynamic stability  - Monitor arterial and/or venous puncture sites for bleeding and/or hematoma  - Assess quality of pulses, skin color and temperature  - Assess for signs of decreased coronary artery perfusion - ex.  Angina  - Evaluate fluid balance, assess for edema, trend weights  Outcome: Progressing  Goal: Absence of cardiac arrhythmias or at baseline  Description: INTERVENTIONS:  - Continuous cardiac monitoring, monitor vital signs, obtain 12 lead EKG if indicated  - Evaluate effectiveness of antiarrhythmic and heart rate control medications as ordered  - Initiate emergency measures for life threatening arrhythmias  - Monitor electrolytes and administer replacement therapy as ordered  Outcome: Progressing     Problem: Patient/Family Goals  Goal: Patient/Family Long Term Goal  Description: Patient's Long Term Goal: Stay healthy    Interventions:  - Medication management  -Dietary management  -Stress/anxiety management  -Prompt follow up with physicians  - See additional Care Plan goals for specific interventions  Outcome: Progressing  Goal: Patient/Family Short Term Goal  Description: Patient's Short Term Goal: Discharge home    Interventions:   - Pain management  -Medication management  -Cardiology consult  -2d Echo    - See additional Care Plan goals for specific interventions  Outcome: Progressing     Problem: PAIN - ADULT  Goal: Verbalizes/displays adequate comfort level or patient's stated pain goal  Description: INTERVENTIONS:  - Encourage pt to monitor pain and request assistance  - Assess pain using appropriate pain scale  - Administer analgesics based on type and severity of pain and evaluate response  - Implement non-pharmacological measures as appropriate and evaluate response  - Consider cultural and social influences on pain and pain management  - Manage/alleviate anxiety  - Utilize distraction and/or relaxation techniques  - Monitor for opioid side effects  - Notify MD/LIP if interventions unsuccessful or patient reports new pain  - Anticipate increased pain with activity and pre-medicate as appropriate  Outcome: Progressing     Problem: SAFETY ADULT - FALL  Goal: Free from fall injury  Description: INTERVENTIONS:  - Assess pt frequently for physical needs  - Identify cognitive and physical deficits and behaviors that affect risk of falls.   - [place] fall precautions as indicated by assessment.  - Educate pt/family on patient safety including physical limitations  - Instruct pt to call for assistance with activity based on assessment  - Modify environment to reduce risk of injury  - Provide assistive devices as appropriate  - Consider OT/PT consult to assist with strengthening/mobility  - Encourage toileting schedule  Outcome: Progressing

## 2023-11-07 NOTE — PLAN OF CARE
Patient Re Molina. On room air. NSR on tele. No c/o pain. Continent of bowel and bladder. Plan for 2D echo today. Call light within reach. Plan of care updated. Problem: Patient/Family Goals  Goal: Patient/Family Long Term Goal  Description: Patient's Long Term Goal: Stay healthy    Interventions:  - Medication management  -Dietary management  -Stress/anxiety management  -Prompt follow up with physicians  - See additional Care Plan goals for specific interventions  Outcome: Progressing  Goal: Patient/Family Short Term Goal  Description: Patient's Short Term Goal: Discharge home    Interventions:   - Pain management  -Medication management  -Cardiology consult  -2d Echo    - See additional Care Plan goals for specific interventions  Outcome: Progressing     Problem: CARDIOVASCULAR - ADULT  Goal: Maintains optimal cardiac output and hemodynamic stability  Description: INTERVENTIONS:  - Monitor vital signs, rhythm, and trends  - Monitor for bleeding, hypotension and signs of decreased cardiac output  - Evaluate effectiveness of vasoactive medications to optimize hemodynamic stability  - Monitor arterial and/or venous puncture sites for bleeding and/or hematoma  - Assess quality of pulses, skin color and temperature  - Assess for signs of decreased coronary artery perfusion - ex.  Angina  - Evaluate fluid balance, assess for edema, trend weights  Outcome: Progressing  Goal: Absence of cardiac arrhythmias or at baseline  Description: INTERVENTIONS:  - Continuous cardiac monitoring, monitor vital signs, obtain 12 lead EKG if indicated  - Evaluate effectiveness of antiarrhythmic and heart rate control medications as ordered  - Initiate emergency measures for life threatening arrhythmias  - Monitor electrolytes and administer replacement therapy as ordered  Outcome: Progressing     Problem: SAFETY ADULT - FALL  Goal: Free from fall injury  Description: INTERVENTIONS:  - Assess pt frequently for physical needs  - Identify cognitive and physical deficits and behaviors that affect risk of falls.   - [place] fall precautions as indicated by assessment.  - Educate pt/family on patient safety including physical limitations  - Instruct pt to call for assistance with activity based on assessment  - Modify environment to reduce risk of injury  - Provide assistive devices as appropriate  - Consider OT/PT consult to assist with strengthening/mobility  - Encourage toileting schedule  Outcome: Progressing

## 2024-05-07 ENCOUNTER — OFFICE VISIT (OUTPATIENT)
Dept: NEUROLOGY | Facility: CLINIC | Age: 64
End: 2024-05-07
Payer: MEDICARE

## 2024-05-07 VITALS
DIASTOLIC BLOOD PRESSURE: 72 MMHG | BODY MASS INDEX: 29 KG/M2 | SYSTOLIC BLOOD PRESSURE: 146 MMHG | HEART RATE: 81 BPM | RESPIRATION RATE: 16 BRPM | WEIGHT: 170 LBS

## 2024-05-07 DIAGNOSIS — R25.1 TREMOR: Primary | ICD-10-CM

## 2024-05-07 PROCEDURE — 99204 OFFICE O/P NEW MOD 45 MIN: CPT | Performed by: OTHER

## 2024-05-07 RX ORDER — PROPRANOLOL HYDROCHLORIDE 10 MG/1
10 TABLET ORAL 3 TIMES DAILY
Qty: 90 TABLET | Refills: 3 | Status: SHIPPED | OUTPATIENT
Start: 2024-05-07

## 2024-05-07 RX ORDER — PROPRANOLOL HYDROCHLORIDE 10 MG/1
10 TABLET ORAL 3 TIMES DAILY
Qty: 90 TABLET | Refills: 3 | Status: SHIPPED | OUTPATIENT
Start: 2024-05-07 | End: 2024-05-07

## 2024-05-07 NOTE — PROGRESS NOTES
[facility] OUTPATIENT NEUROLOGY CONSULTATION    Date of consult: 5/7/2024    Assessment:    ICD-10-CM    1. Tremor  R25.1 MRI BRAIN (CPT=70551)   Essential tremor vs Parkinsonian tremor     Plan:      Procedures    MRI BRAIN (CPT=70551)   Offered propranolol low dose trial  See orders and medications filed with this encounter. The patient indicates understanding of these issues and agrees with the plan.  Discussed with patient regarding assessment, work up, care plan and possible adverse and side effects of the medication  RTC 2-3 months  Pt should go ER for any new or worsening symptoms and contact office for above tests' results, any possible side effects from medication or other concerns.    Subjective:   CC/Reason for consult: new onset tremor and shaking     HPI: Leonardo Rockwell is a 63 year old male with past medical history as listed below presents here for initial evaluation of new onset tremor and shaking x 8-10 months,   Pt c/o tremors in right arm and hand in the last 3 months. Pt thinks it is due to the lexapro, he reports tremor starting after his wife passed, denies family history of tremors or parkinson's disease, both hands have tremors , right > left, reports sometimes waking up shaking all over.    History/Other:   REVIEW OF SYSTEMS:  A comprehensive 14-point system was reviewed. Pertinent positives and negatives are noted in HPI.       Current Outpatient Medications:     propranolol 10 MG Oral Tab, Take 1 tablet (10 mg total) by mouth 3 (three) times daily., Disp: 90 tablet, Rfl: 3    LEXAPRO 5 MG Oral Tab, Take 1 tablet (5 mg total) by mouth daily., Disp: , Rfl:   Allergies:  Allergies   Allergen Reactions    Levaquin [Levofloxacin] OTHER (SEE COMMENTS)     joint     Past Medical History:    Anxiety    Depression    Essential hypertension     Past Surgical History:   Procedure Laterality Date    Tonsillectomy       Social History:  Social History     Tobacco Use    Smoking status: Never    Smokeless  tobacco: Never   Substance Use Topics    Alcohol use: Yes     Comment: socially      Family History   Problem Relation Age of Onset    Stroke Mother     Cancer Father       Objective:   Physical Examination:  /72   Pulse 81   Resp 16   Wt 170 lb (77.1 kg)   BMI 29.18 kg/m²   General: Awake and alert; in no acute distress  HEENT: Eye sclerae are anicteric; scalp is atraumatic  Neck: Supple  Cardiac: Regular rate and regular rhythm  Lungs: Clear   Abdomen:  non-tender  Extremities: No clubbing or cyanosis; moves extremities   Psychiatric: Normal mood and affect; answers questions appropriately  Dermatologic: No rashes; no edema  Neurological Examination:  Language: normal   Speech: no dysarthria  CN: II-XII intact  Motor strength: 5/5 all extremities  Tone: normal  DTRs: + symmetric  Coordination: resting and action tremor+ right>left  Sensory: symmetric   Gait: fair; minimally less arm swing on right hand    Data Reviewed on 5/7/2024  Notes Reviewed on 5/7/2024  Labs Reviewed  on 5/7/2024    Ady \"Arnav\"MD [place]   Neurology  [facility]  5/7/2024, 9:29 AM  Consultation Report: being sent/fax/route to requesting provider   CC: Raf Mancilla MD

## 2024-05-07 NOTE — PATIENT INSTRUCTIONS
Refill policies:    Allow 2-3 business days for refills; controlled substances may take longer.  Contact your pharmacy at least 5 days prior to running out of medication and have them send an electronic request or submit request through the “request refill” option in your [employer] account.  Refills are not addressed on weekends; covering physicians do not authorize routine medications on weekends.  No narcotics or controlled substances are refilled after noon on Fridays or by on call physicians.  By law, narcotics must be electronically prescribed.  A 30 day supply with no refills is the maximum allowed.  If your prescription is due for a refill, you may be due for a follow up appointment.  To best provide you care, patients receiving routine medications need to be seen at least once a year.  Patients receiving narcotic/controlled substance medications need to be seen at least once every 3 months.  In the event that your preferred pharmacy does not have the requested medication in stock (e.g. Backordered), it is your responsibility to find another pharmacy that has the requested medication available.  We will gladly send a new prescription to that pharmacy at your request.    Scheduling Tests:    If your physician has ordered radiology tests such as MRI or CT scans, please contact Central Scheduling at [phone] right away to schedule the test.  Once scheduled, the [facility] Centralized Referral Team will work with your insurance carrier to obtain pre-certification or prior authorization.  Depending on your insurance carrier, approval may take 3-10 days.  It is highly recommended patients assure they have received an authorization before having a test performed.  If test is done without insurance authorization, patient may be responsible for the entire amount billed.      Precertification and Prior Authorizations:  If your physician has recommended that you have a procedure or additional testing performed the [facility]  Centralized Referral Team will contact your insurance carrier to obtain pre-certification or prior authorization.    You are strongly encouraged to contact your insurance carrier to verify that your procedure/test has been approved and is a COVERED benefit.  Although the [facility] Centralized Referral Team does its due diligence, the insurance carrier gives the disclaimer that \"Although the procedure is authorized, this does not guarantee payment.\"    Ultimately the patient is responsible for payment.   Thank you for your understanding in this matter.  Paperwork Completion:  If you require FMLA or disability paperwork for your recovery, please make sure to either drop it off or have it faxed to our office at [phone]. Be sure the form has your name and date of birth on it.  The form will be faxed to our Forms Department and they will complete it for you.  There is a 25$ fee for all forms that need to be filled out.  Please be aware there is a 10-14 day turnaround time.  You will need to sign a release of information ([place]) form if your paperwork does not come with one.  You may call the Forms Department with any questions at [phone].  Their fax number is [phone].

## 2024-07-11 ENCOUNTER — OFFICE VISIT (OUTPATIENT)
Dept: NEUROLOGY | Facility: CLINIC | Age: 64
End: 2024-07-11
Payer: MEDICARE

## 2024-07-11 VITALS — DIASTOLIC BLOOD PRESSURE: 64 MMHG | HEART RATE: 70 BPM | RESPIRATION RATE: 16 BRPM | SYSTOLIC BLOOD PRESSURE: 138 MMHG

## 2024-07-11 DIAGNOSIS — R25.1 TREMOR: Primary | ICD-10-CM

## 2024-07-11 DIAGNOSIS — M54.31 BILATERAL SCIATICA: ICD-10-CM

## 2024-07-11 DIAGNOSIS — M54.32 BILATERAL SCIATICA: ICD-10-CM

## 2024-07-11 PROCEDURE — 99214 OFFICE O/P EST MOD 30 MIN: CPT | Performed by: OTHER

## 2024-07-11 NOTE — PATIENT INSTRUCTIONS
Refill policies:    Allow 2-3 business days for refills; controlled substances may take longer.  Contact your pharmacy at least 5 days prior to running out of medication and have them send an electronic request or submit request through the “request refill” option in your R17 account.  Refills are not addressed on weekends; covering physicians do not authorize routine medications on weekends.  No narcotics or controlled substances are refilled after noon on Fridays or by on call physicians.  By law, narcotics must be electronically prescribed.  A 30 day supply with no refills is the maximum allowed.  If your prescription is due for a refill, you may be due for a follow up appointment.  To best provide you care, patients receiving routine medications need to be seen at least once a year.  Patients receiving narcotic/controlled substance medications need to be seen at least once every 3 months.  In the event that your preferred pharmacy does not have the requested medication in stock (e.g. Backordered), it is your responsibility to find another pharmacy that has the requested medication available.  We will gladly send a new prescription to that pharmacy at your request.    Scheduling Tests:    If your physician has ordered radiology tests such as MRI or CT scans, please contact Central Scheduling at [phone] right away to schedule the test.  Once scheduled, the [facility] Centralized Referral Team will work with your insurance carrier to obtain pre-certification or prior authorization.  Depending on your insurance carrier, approval may take 3-10 days.  It is highly recommended patients assure they have received an authorization before having a test performed.  If test is done without insurance authorization, patient may be responsible for the entire amount billed.      Precertification and Prior Authorizations:  If your physician has recommended that you have a procedure or additional testing performed the [facility]  Centralized Referral Team will contact your insurance carrier to obtain pre-certification or prior authorization.    You are strongly encouraged to contact your insurance carrier to verify that your procedure/test has been approved and is a COVERED benefit.  Although the On license of [facility] Centralized Referral Team does its due diligence, the insurance carrier gives the disclaimer that \"Although the procedure is authorized, this does not guarantee payment.\"    Ultimately the patient is responsible for payment.   Thank you for your understanding in this matter.  Paperwork Completion:  If you require FMLA or disability paperwork for your recovery, please make sure to either drop it off or have it faxed to our office at [phone]. Be sure the form has your name and date of birth on it.  The form will be faxed to our Forms Department and they will complete it for you.  There is a 25$ fee for all forms that need to be filled out.  Please be aware there is a 10-14 day turnaround time.  You will need to sign a release of information ([place]) form if your paperwork does not come with one.  You may call the Forms Department with any questions at [phone].  Their fax number is [phone].

## 2024-07-11 NOTE — PROGRESS NOTES
Patient here to follow up regarding tremors. Stopped taking Propranolol. Overall has been little bit better since last visit.

## 2024-07-11 NOTE — PROGRESS NOTES
[facility] Neurology Outpatient Progress Note  Date of service: 7/11/2024    Assessment:     ICD-10-CM    1. Tremor  R25.1    Essential tremor vs Parkinsonian tremor   2. Bilateral sciatica  M54.31 Referral to Physical Therapy and Rehab    M54.32         Plan:      Procedures    Referral to Physical Therapy and Rehab     Reports side effect (low BP) from propranolol low dose, prefers no meds now  Advised pt to have MRI brain done  Refer to Dr Ang Jara  See orders and medications filed with this encounter. The patient indicates understanding of these issues and agrees with the plan.  Discussed with patient regarding assessment, work up, care plan  Pt should go ER for any new or worsening symptoms  Subjective:   History:  Patient here to follow up regarding tremors. Stopped taking Propranolol due to low bp; Overall his tremor is unchanged. MRI is not done.  He reports pain from buttock region radiating to back thigh/hamstring, worse when sitting or driving.  Per initial visit note:  Leonardo Rockwell is a 63 year old male with past medical history as listed below presents here for initial evaluation of new onset tremor and shaking x 8-10 months,   Pt c/o tremors in right arm and hand in the last 3 months. Pt thinks it is due to the lexapro, he reports tremor starting after his wife passed, denies family history of tremors or parkinson's disease, both hands have tremors , right > left, reports sometimes waking up shaking all over .  History/Other:   REVIEW OF SYSTEMS:  A 10-point system was reviewed. Pertinent positives and negatives are noted as above     No current outpatient medications on file.  Allergies:  Allergies   Allergen Reactions    Levaquin [Levofloxacin] OTHER (SEE COMMENTS)     joint     Past Medical History:    Anxiety    Depression    Essential hypertension     Past Surgical History:   Procedure Laterality Date    Tonsillectomy       Social History:  Social History     Tobacco Use    Smoking status: Never     Smokeless tobacco: Never   Substance Use Topics    Alcohol use: Yes     Comment: socially      Family History   Problem Relation Age of Onset    Stroke Mother     Cancer Father       Objective:   Neurological Examination:  /64   Pulse 70   Resp 16   Language: normal   Speech: no dysarthria  CN: II-XII intact  Motor strength: 5/5 all extremities  Tone: normal  DTRs: + symmetric  Coordination: resting and action tremor+ right>left  Sensory: symmetric   Gait: fair; minimally less arm swing on right núñez    Test reviewed on 7/11/2024      Ady \"Arnav\"MD [facility]  Neurology  [facility]  7/11/2024, 10:48 AM  CC: Raf Mancilla MD

## 2024-07-19 ENCOUNTER — OFFICE VISIT (OUTPATIENT)
Dept: PHYSICAL THERAPY | Facility: HOSPITAL | Age: 64
End: 2024-07-19
Attending: Other
Payer: MEDICARE

## 2024-07-19 DIAGNOSIS — M54.32 BILATERAL SCIATICA: Primary | ICD-10-CM

## 2024-07-19 DIAGNOSIS — M54.31 BILATERAL SCIATICA: Primary | ICD-10-CM

## 2024-07-19 PROCEDURE — 97110 THERAPEUTIC EXERCISES: CPT

## 2024-07-19 PROCEDURE — 97162 PT EVAL MOD COMPLEX 30 MIN: CPT

## 2024-07-19 NOTE — PROGRESS NOTES
SPINE EVALUATION:     Diagnosis:   Bilateral sciatica (M54.31,M54.32)       Referring Provider: Ady Penn  Date of Evaluation:    7/19/2024    Precautions:  None Next MD visit:   none scheduled  Date of Surgery: n/a     PATIENT SUMMARY   Leonardo Rockwell is a 63 year old male who presents to therapy today with complaints of bilateral hip/hamstring pain, can only drive for 10 min before needing to get out and move. Has been going on for about 6 weeks, symptoms are only from buttocks down thigh. Pt has had sciatica a few years ago but this feels different. Standing does not increase the pain. Specifically gets the pain while driving, sitting in a regular chair he feels okay. No numbness/tingling down legs. Left is a little bit worse (sciatica was on left). Got on bike the other day and this did not cause any pain. Pt was walking dog multiple times a day up until 2 months ago, pain started after this. Pt is seeing neurologist for RUE tremor, tried propranolol but this made BP way too low, also recently came off of antidepressant.     Pt describes pain level current 0/10, at best 0/10, at worst 8/10.   Aggravating: driving/sitting in the car   Relieving: getting up and walking around   Nature: pulling, achy sensation  24-Hour Pattern: none  How long do symptoms last? Resolve within a minute or so.   Current functional limitations include unable to drive more than 10ish min before needing to get out and move around.     Leonardo describes prior level of function previous history of sciatica. Pt goals include reduce pain.  Past medical history was reviewed with Leonardo. Significant findings include  has a past medical history of Anxiety, Depression, and Essential hypertension.   Pt denies diplopia, dysarthria, dysphasia, dizziness, drop attacks, bowel/bladder changes, saddle anesthesia, and SANTIAGO LE N/T.    ASSESSMENT  Leonardo presents to physical therapy evaluation with primary c/o bilateral hip/hamstring pain. The  results of the objective tests and measures show decreased hamstring length bilaterally, decreased lumbar flexion, negative slump test, stiff end feel with PA joint mobilizations.  Functional deficits include but are not limited to unable to drive more than 10 min without pain.  Signs and symptoms are consistent with diagnosis of bilateral hip/hamstring pain. Pt and PT discussed evaluation findings, pathology, POC and HEP.  Pt voiced understanding and performs HEP correctly without reported pain. Skilled Physical Therapy is medically necessary to address the above impairments and reach functional goals.     OBJECTIVE:   Observation/Posture: unremarkable  Neuro Screen: SILT, reflexes 1+ bilat  Vitals:  140/79 mmHG  80 BPM    Lumbar AROM: (* denotes performed with pain)  Flexion: 40 deg  Extension: 10 deg   Sidebending: R WNL; L WNL  Rotation: R WNL; L WNL    Hip AROM/PROM:   ER: R 28 deg, L 35 deg  IR: 35 deg R/L     Accessory motion: stiff end feel with PA's to lumbar spine, UPA's WNL  Palpation: no increased tension to glute med, piriformis    Strength: (* denotes performed with pain)  LE   Hip flexion (L2): R 5/5; L 5/5  Knee Flexion: R 5/5; L 5/5   Knee extension (L3): R 5/5; L 5/5   DF (L4): R 5/5; L 5/5  Great Toe Ext (L5): R 5/5, L 5/5  PF (S1): R 5/5; L 5/5       Special tests:   Slump: negative   THOMAS: 33 cm R, 32 cm L  NAHED: negative R/L   SLR: L 45 deg, R 55 deg     Today’s Treatment and Response:   Pt education was provided on exam findings, treatment diagnosis, treatment plan, expectations, and prognosis. Pt was also provided recommendations for activity modifications, possible soreness after evaluation, and importance of remaining active  Patient was instructed in and issued a HEP for:   Access Code: [email or web address]  URL: [email or web address]  Date: 07/19/2024  Prepared by: May Sahu    Exercises  - Supine Hamstring Stretch with Strap  - 1 x daily - 7 x weekly - 1-2 sets - 3 reps - 30  sec hold  - Supine Lower Trunk Rotation  - 1 x daily - 7 x weekly - 2 sets - 10 reps  - Gastroc Stretch on Wall  - 1 x daily - 7 x weekly - 1-2 sets - 3 reps - 15 sec hold    Charges: PT Eval Moderate Complexity, TE 1      Total Timed Treatment: 10 min     Total Treatment Time: 40 min   - 3x30 sec supine hamstring stretch  - x20 LTR  - 2x15 sec gastroc stretch R/L  - Pt education: use of ice or heat for symptoms, what to feel and not feel with exercises    Based on clinical rationale and outcome measures, this evaluation involved Moderate Complexity decision making due to 1-2 personal factors/comorbidities, 3 body structures involved/activity limitations, and evolving symptoms including changing pain levels.  PLAN OF CARE:    Goals: (to be met in 8 visits)   - Pt will improve hamstring length bilaterally by 5 deg or more to reduce pain with prolonged sitting.   - Pt will have WFL lumbar joint mobility to improve lumbar flexion AROM.   - Pt will be able to drive 15 min or more without back pain to improve community access.  - Pt will be independent with HEP to maintain gains made in therapy.     Frequency / Duration: Patient will be seen for 1-2 x/week or a total of 8 visits over a 90 day period. Treatment will include: Gait training, Manual Therapy, Neuromuscular Re-education, Therapeutic Activities, Therapeutic Exercise, Home Exercise Program instruction, and Modalities to include: ice/heat    Education or treatment limitation: None  Rehab Potential:good    LEFS Score  LEFS Score: 51.25 % (7/19/2024  9:02 AM)      Patient/Family/Caregiver was advised of these findings, precautions, and treatment options and has agreed to actively participate in planning and for this course of care.    Thank you for your referral. Please co-sign or sign and return this letter via fax as soon as possible to [phone]. If you have any questions, please contact me at Dept: [phone]    Sincerely,  Electronically signed by  therapist: May Sahu PT, DPT    Physician's certification required: Yes  I certify the need for these services furnished under this plan of treatment and while under my care.    X___________________________________________________ Date____________________    Certification From: 7/19/2024  To:10/17/2024

## 2024-07-23 ENCOUNTER — OFFICE VISIT (OUTPATIENT)
Dept: PHYSICAL THERAPY | Facility: HOSPITAL | Age: 64
End: 2024-07-23
Attending: Other
Payer: MEDICARE

## 2024-07-23 PROCEDURE — 97110 THERAPEUTIC EXERCISES: CPT

## 2024-07-23 PROCEDURE — 97140 MANUAL THERAPY 1/> REGIONS: CPT

## 2024-07-23 NOTE — PROGRESS NOTES
Diagnosis:   Bilateral sciatica (M54.31,M54.32)      Referring Provider: Ady Penn  Date of Evaluation:     7/19/2024    Precautions:  None Next MD visit:   none scheduled  Date of Surgery: n/a   Insurance Primary/Secondary: MEDICARE / COMMERCIAL     # Auth Visits: 8            Subjective: Pt reports he has been feeling the same since eval. [place] like he was able to drive a little farther before needing to stop on the way here.     Pain: pain getting out of car      Objective:   Lumbar AROM: (* denotes performed with pain)  Flexion: 40 deg  Extension: 10 deg   Sidebending: R WNL; L WNL  Rotation: R WNL; L WNL     Hip AROM/PROM:   ER: R 28 deg, L 35 deg  IR: 35 deg R/L      Accessory motion: stiff end feel with PA's to lumbar spine, UPA's WNL  Palpation: no increased tension to glute med, piriformis     Strength: (* denotes performed with pain)  LE   Hip flexion (L2): R 5/5; L 5/5  Knee Flexion: R 5/5; L 5/5            Knee extension (L3): R 5/5; L 5/5            DF (L4): R 5/5; L 5/5  Great Toe Ext (L5): R 5/5, L 5/5  PF (S1): R 5/5; L 5/5         Special tests:   Slump: negative   THOMAS: 33 cm R, 32 cm L  NAHED: negative R/L   SLR: L 45 deg, R 55 deg       Assessment: Pt attends first follow up after initial evaluation. Pt subjectively reports symptoms are the same since initial eval. Completed manual intervention to lumbar spine with central PA's, gr III-IV, pt tolerates with reports of mild tenderness, stiff end feel. Completed lateral hip glide for improved joint mobility bilaterally, no adverse reaction to treatment. Followed up with lumbar and hip AAROM exercises to promote joint opening. Continue per plan of care to achieve goals.       Goals:   (to be met in 8 visits)   - Pt will improve hamstring length bilaterally by 5 deg or more to reduce pain with prolonged sitting.   - Pt will have WFL lumbar joint mobility to improve lumbar flexion AROM.   - Pt will be able to drive 15 min or more without back pain  to improve community access.  - Pt will be independent with HEP to maintain gains made in therapy.     Plan: Continue per plan of care. Next session: trunk flexion with swiss ball  Date: 7/23/2024  TX#: 2/8 Date:                 TX#: 3/ Date:                 TX#: 4/ Date:                 TX#: 5/ Date:   Tx#: 6/   Therex: 25 min  Warm up on bike, level 1, 5 min  LTR on swiss ball, 2x10   Hip/knee flexion with swiss ball, x20   Hamstring stretch with strap, 3x30 sec R/L  Side stepping in // bars with yellow band at ankles, 3 laps   Standing hip ext with yellow band, 2x10 R/L        Manual: 15 min  Central PA's to lumbar spine, x10 each level, gr III-IV  Transverse PA's to lumbar spine, x10 R/L  Lateral hip glide, 2x10, gr III-IV, R/L                       HEP:   Access Code: [email or web address]  URL: [email or web address]  Date: 07/19/2024  Prepared by: May Sahu     Exercises  - Supine Hamstring Stretch with Strap  - 1 x daily - 7 x weekly - 1-2 sets - 3 reps - 30 sec hold  - Supine Lower Trunk Rotation  - 1 x daily - 7 x weekly - 2 sets - 10 reps  - Gastroc Stretch on Wall  - 1 x daily - 7 x weekly - 1-2 sets - 3 reps - 15 sec hold    Charges: TE 2 Manual 1       Total Timed Treatment: 40 min  Total Treatment Time: 40 min

## 2024-07-25 ENCOUNTER — OFFICE VISIT (OUTPATIENT)
Dept: PHYSICAL THERAPY | Facility: HOSPITAL | Age: 64
End: 2024-07-25
Attending: Other
Payer: MEDICARE

## 2024-07-25 PROCEDURE — 97110 THERAPEUTIC EXERCISES: CPT

## 2024-07-25 PROCEDURE — 97140 MANUAL THERAPY 1/> REGIONS: CPT

## 2024-07-25 NOTE — PROGRESS NOTES
Diagnosis:   Bilateral sciatica (M54.31,M54.32)      Referring Provider: Ady Penn  Date of Evaluation:     7/19/2024    Precautions:  None Next MD visit:   none scheduled  Date of Surgery: n/a   Insurance Primary/Secondary: MEDICARE / COMMERCIAL     # Auth Visits: 8            Subjective: Pt reports he felt good following last session, maybe a little sore the following day but feels like legs are getting looser. Still had to stop about half way here due to the leg symptoms to get out and walk.      Pain: pain getting out of car      Objective:   Lumbar AROM: (* denotes performed with pain)  Flexion: 40 deg  Extension: 10 deg   Sidebending: R WNL; L WNL  Rotation: R WNL; L WNL     Hip AROM/PROM:   ER: R 28 deg, L 35 deg  IR: 35 deg R/L      Accessory motion: stiff end feel with PA's to lumbar spine, UPA's WNL  Palpation: no increased tension to glute med, piriformis     Strength: (* denotes performed with pain)  LE   Hip flexion (L2): R 5/5; L 5/5  Knee Flexion: R 5/5; L 5/5            Knee extension (L3): R 5/5; L 5/5            DF (L4): R 5/5; L 5/5  Great Toe Ext (L5): R 5/5, L 5/5  PF (S1): R 5/5; L 5/5         Special tests:   Slump: negative   THOMAS: 33 cm R, 32 cm L  NAHED: negative R/L   SLR: L 45 deg, R 55 deg       Assessment: Pt has excellent participation in therapy sessions. Same symptoms continued to be reported, however slight improvement in overall BLE muscle tension. Pt noted to have some soreness with central PA at L4 level today, lumbar spine is stiff grossly with mobilization. Followed up with lower lumbar stretching and range of motion exercises. Pt reports relief with hip/knee flexion with swiss ball. Pt educated on continuing to monitor symptoms and complete same exercises at home.       Goals:   (to be met in 8 visits)   - Pt will improve hamstring length bilaterally by 5 deg or more to reduce pain with prolonged sitting.   - Pt will have WFL lumbar joint mobility to improve lumbar flexion  AROM.   - Pt will be able to drive 15 min or more without back pain to improve community access.  - Pt will be independent with HEP to maintain gains made in therapy.     Plan: Continue per plan of care.   Date: 7/23/2024  TX#: 2/8 Date:7/25/2024                TX#: 3/8 Date:                 TX#: 4/ Date:                 TX#: 5/ Date:   Tx#: 6/   Therex: 25 min  Warm up on bike, level 1, 5 min  LTR on swiss ball, 2x10   Hip/knee flexion with swiss ball, x20   Hamstring stretch with strap, 3x30 sec R/L  Side stepping in // bars with yellow band at ankles, 3 laps   Standing hip ext with yellow band, 2x10 R/L  Therex: 30 min  Warm up on bike, level 1, 5 min  LTR on swiss ball, 2x10   Hip/knee flexion with swiss ball, x20   Hamstring stretch with strap, 3x30 sec R/L  Bridge, 2x10   Trunk flexion with swiss ball stretch, x10      Manual: 15 min  Central PA's to lumbar spine, x10 each level, gr III-IV  Transverse PA's to lumbar spine, x10 R/L  Lateral hip glide, 2x10, gr III-IV, R/L   Manual: 15 min  Central PA's to lumbar spine,2 x10 each level, gr III-IV  Lateral hip glide, 2x10, gr III-IV, R/L  Long axis distraction, x15 R/L                    HEP:   Access Code: [email or web address]  URL: [email or web address]  Date: 07/19/2024  Prepared by: May Sahu     Exercises  - Supine Hamstring Stretch with Strap  - 1 x daily - 7 x weekly - 1-2 sets - 3 reps - 30 sec hold  - Supine Lower Trunk Rotation  - 1 x daily - 7 x weekly - 2 sets - 10 reps  - Gastroc Stretch on Wall  - 1 x daily - 7 x weekly - 1-2 sets - 3 reps - 15 sec hold    Charges: TE 2 Manual 1       Total Timed Treatment: 45 min  Total Treatment Time: 45 min

## 2024-07-26 ENCOUNTER — HOSPITAL ENCOUNTER (OUTPATIENT)
Dept: MRI IMAGING | Facility: HOSPITAL | Age: 64
Discharge: HOME | End: 2024-07-26
Attending: Other
Payer: MEDICARE

## 2024-07-26 DIAGNOSIS — R25.1 TREMOR: ICD-10-CM

## 2024-07-26 PROCEDURE — 70551 MRI BRAIN STEM W/O DYE: CPT | Performed by: OTHER

## 2024-08-02 DIAGNOSIS — R25.1 TREMOR: ICD-10-CM

## 2024-08-02 NOTE — TELEPHONE ENCOUNTER
Medication: PROPRANOLOL 10 MG Oral Tab      Date of last refill: 05/07/2024 (#90/3)  [place]     Last office visit: 05/07/2024  Due back to clinic per last office note:  2 months  Date next office visit scheduled:    Future Appointments   Date Time Provider Department Center   8/7/2024 10:00 AM Andrez Tierney MD [facility]   8/8/2024 11:45 AM Hedin, May  PHYS [facility]   8/13/2024 12:30 PM Hedin, May EH PHYS [facility]   8/15/2024 12:30 PM Hedin, May EH PHYS [facility]   8/20/2024 12:30 PM Hedin, May EH PHYS [facility]   8/22/2024 11:45 AM Hedin, May EH PHYS [facility]   8/27/2024 11:00 AM Hedin, May EH PHYS [facility]   8/29/2024 11:00 AM Hedin, May EH PHYS [facility]   9/3/2024 11:00 AM Hedin, May EH PHYS [facility]   9/5/2024 11:00 AM Hedin, May EH PHYS [facility]           Last OV note recommendation:    Assessment:      ICD-10-CM     1. Tremor  R25.1 MRI BRAIN (CPT=70551)   Essential tremor vs Parkinsonian tremor      Plan:          Procedures    MRI BRAIN (CPT=70551)   Offered propranolol low dose trial  See orders and medications filed with this encounter. The patient indicates understanding of these issues and agrees with the plan.  Discussed with patient regarding assessment, work up, care plan and possible adverse and side effects of the medication  RTC 2-3 months  Pt should go ER for any new or worsening symptoms and contact office for above tests' results, any possible side effects from medication or other concerns.

## 2024-08-03 RX ORDER — PROPRANOLOL HYDROCHLORIDE 10 MG/1
10 TABLET ORAL 3 TIMES DAILY
Qty: 270 TABLET | Refills: 1 | Status: SHIPPED | OUTPATIENT
Start: 2024-08-03

## 2024-08-07 ENCOUNTER — OFFICE VISIT (OUTPATIENT)
Dept: NEUROLOGY | Facility: CLINIC | Age: 64
End: 2024-08-07
Payer: MEDICARE

## 2024-08-07 VITALS
BODY MASS INDEX: 31.92 KG/M2 | SYSTOLIC BLOOD PRESSURE: 134 MMHG | WEIGHT: 187 LBS | HEART RATE: 83 BPM | OXYGEN SATURATION: 96 % | RESPIRATION RATE: 16 BRPM | HEIGHT: 64 IN | DIASTOLIC BLOOD PRESSURE: 70 MMHG

## 2024-08-07 DIAGNOSIS — G20.A1 PARKINSON'S DISEASE WITHOUT DYSKINESIA OR FLUCTUATING MANIFESTATIONS (HCC): Primary | ICD-10-CM

## 2024-08-07 PROCEDURE — 99215 OFFICE O/P EST HI 40 MIN: CPT | Performed by: OTHER

## 2024-08-07 NOTE — PROGRESS NOTES
HPI:    Patient ID: Leonardo Rockwell is a 63 year old male.    HPI  I am seeing Mr Rockwell for further evaluation and management of his tremors.  He told me he started having tremors in his right hand approximately 3 months ago, they occur at rest and in action and bothers him when he wants to sleep.  He denies any tremors on the left side of his body or bilateral lower extremities.   His right hand/forearm has been painful and he is not sure why.  He gets spasm in bilateral lower extremities specially he is writing anything he cannot drive for longer than 20 minutes before he has to stop the car and gets out to stretch.  He thinks his fine motor skills are not affected.  He mentioned that he has a stooped posture when he walks which she has noticed recently but he denies any shuffling or any freezing of gait.  His swallowing is not affected.  Denies cognitive issues, visual hibernation, orthostatic hypotension or RBD.  His wife has  about 16 or 10 months ago and he has been dealing with a lot of stress since.  He has been started on propranolol that he told me which did not help with the tremor and caused him to have hypotension        HISTORY:  Past Medical History:    Anxiety    Depression    Essential hypertension      Past Surgical History:   Procedure Laterality Date    Tonsillectomy        Family History   Problem Relation Age of Onset    Stroke Mother     Cancer Father       Social History     Socioeconomic History    Marital status:    Tobacco Use    Smoking status: Never    Smokeless tobacco: Never   Vaping Use    Vaping status: Never Used   Substance and Sexual Activity    Alcohol use: Yes     Comment: socially     Drug use: No   Other Topics Concern    Caffeine Concern No    Exercise Yes     Comment: almost every day     Social Determinants of Health     Food Insecurity: No Food Insecurity (2023)    Food Insecurity     Food Insecurity: Never true   Transportation Needs: No Transportation  Needs (11/6/2023)    Transportation Needs     Lack of Transportation: No   Housing Stability: Low Risk  (11/6/2023)    Housing Stability     Housing Instability: No        Review of Systems  Negative except as HPI       Current Outpatient Medications   Medication Sig Dispense Refill    carbidopa-levodopa  MG Oral Tab Take 1 tablet by mouth 3 (three) times daily. 90 tablet 3    PROPRANOLOL 10 MG Oral Tab TAKE 1 TABLET BY MOUTH THREE TIMES A DAY (Patient not taking: Reported on 8/7/2024) 270 tablet 1     Allergies:  Allergies   Allergen Reactions    Levaquin [Levofloxacin] OTHER (SEE COMMENTS)     joint     PHYSICAL EXAM:   Physical Exam    General Appearance: Well nourished, well developed, no apparent distress.     HEENT: Normocephalic and atraumatic. Normal sclera.  Neck: Normal range of motion. Neck supple.  Cardiovascular: Normal rate, regular rhythm   Pulmonary/Chest: Effort normal     Mental Status Exam: Patient is awake, alert and oriented to person, place and time with normal memory, fund of knowledge, attention/concentration and language.    Cranial Nerves: II: Visual acuity: normal, funduscopic exam is normal  II: Visual fields: normal to confrontation test  III: Pupils: equal, round, reactive to light, NO APD  III,IV,VI: Normal EOM. Saccades  V: Facial sensation: intact  VII: Facial strength: Normal  VIII: Hearing: Decreased hearing bilaterally.  He wears hearing aids.  IX: Palate elevates symmetrically  XI: Shoulder shrug: symmetric  XII: Tongue protrudes in midline    Motor Exam: Normal tone. Strength is  5 out of 5 in proximal and distal muscles of upper and lower extremities  Movement disorders exam:  He has slight hypophonic speech, his speech however was easy to understand.  His face is asked with decreased blinking rate.  He has grade 1 rigidity in his neck and bilateral upper extremities and is not rigid in his lower extremities.  He has a grade 2 rest tremor in his right hand that was  present approximately 75% of the interview time.  There was only minimal postural tremor in his right index finger.  There were no tremors at rest in left extremities or right lower extremity.  He had no tremors in his jaw or his lips.  He has a grade 2 bradykinesia on finger tapping on the right and grade 1 bradykinesia on finger tapping on the left.  He has a grade 1 bradykinesia on movement on the right and he is not bradykinetic on hand movement on the left.  He had grade 2 bradykinesia on alternating hand motion on the right and grade 1 bradykinesia on antennae hand motion on the left.  He was not bradykinetic in his lower extremities.  He could stand up and walk unassisted, he has decreased arm swing on the right side.  No shuffling or freezing of gait.   DTR: 2+ and symmetric. Downgoing toes bilaterally    Sensory: Normal sensation to superficial touch in all extremities    Coordination: Normal finger-nose-finger test     Gait: see movement disorder exam    TESTS/IMAGING:         ASSESSMENT/PLAN:     Encounter Diagnosis   Name Primary?    Parkinson's disease without dyskinesia or fluctuating manifestations (HCC) Yes       No orders of the defined types were placed in this encounter.    Parkinson's disease:  I told him he has Parkinson's disease.  I explained that the diagnosis of clinical diagnosis based off rest tremor, rigidity and bradykinesia and he has all 3.  I discussed the value of a DaTscan with him.Will defer ordering MELANI scan at this stage.   I told him to start carbidopa levodopa [] 1 tablet 3 times daily.  I Explained most common side effect is nausea and upset stomach  I counseled him about etiology, progression and prognosis.   Will follow up in 3 months with Cyndie Herrera  Thank you for allowing us to participate in your patient's care.  marlee do not hesitate to call if you have any questions.   I will continue to follow with you and will make further recommendations based on their  progress.     60 total minutes spent with patient >50% of visit was spent in counseling and coordination of care      Meds This Visit:  Requested Prescriptions     Signed Prescriptions Disp Refills    carbidopa-levodopa  MG Oral Tab 90 tablet 3     Sig: Take 1 tablet by mouth 3 (three) times daily.       Imaging & Referrals:  None     ID#[id number]

## 2024-08-07 NOTE — PROGRESS NOTES
PT stated he was just following up on tremors he stated he is in a lot of pain  PT stated he is stiff he is now doing PT   Pt stated when driving he can only drive for 15-20 minutes then he has to get out of the car.   PT stated the right hand aches

## 2024-08-07 NOTE — PATIENT INSTRUCTIONS
You have PD    tAKE 1 TABLET 3 TIMES DAILY OF sINEMET     Refill policies:    Allow 2-3 business days for refills; controlled substances may take longer.  Contact your pharmacy at least 5 days prior to running out of medication and have them send an electronic request or submit request through the “request refill” option in your [employer] account.  Refills are not addressed on weekends; covering physicians do not authorize routine medications on weekends.  No narcotics or controlled substances are refilled after noon on Fridays or by on call physicians.  By law, narcotics must be electronically prescribed.  A 30 day supply with no refills is the maximum allowed.  If your prescription is due for a refill, you may be due for a follow up appointment.  To best provide you care, patients receiving routine medications need to be seen at least once a year.  Patients receiving narcotic/controlled substance medications need to be seen at least once every 3 months.  In the event that your preferred pharmacy does not have the requested medication in stock (e.g. Backordered), it is your responsibility to find another pharmacy that has the requested medication available.  We will gladly send a new prescription to that pharmacy at your request.    Scheduling Tests:    If your physician has ordered radiology tests such as MRI or CT scans, please contact Central Scheduling at [phone] right away to schedule the test.  Once scheduled, the [facility] Centralized Referral Team will work with your insurance carrier to obtain pre-certification or prior authorization.  Depending on your insurance carrier, approval may take 3-10 days.  It is highly recommended patients assure they have received an authorization before having a test performed.  If test is done without insurance authorization, patient may be responsible for the entire amount billed.      Precertification and Prior Authorizations:  If your physician has recommended that  you have a procedure or additional testing performed the [facility] Centralized Referral Team will contact your insurance carrier to obtain pre-certification or prior authorization.    You are strongly encouraged to contact your insurance carrier to verify that your procedure/test has been approved and is a COVERED benefit.  Although the [facility] Centralized Referral Team does its due diligence, the insurance carrier gives the disclaimer that \"Although the procedure is authorized, this does not guarantee payment.\"    Ultimately the patient is responsible for payment.   Thank you for your understanding in this matter.  Paperwork Completion:  If you require FMLA or disability paperwork for your recovery, please make sure to either drop it off or have it faxed to our office at [phone]. Be sure the form has your name and date of birth on it.  The form will be faxed to our Forms Department and they will complete it for you.  There is a 25$ fee for all forms that need to be filled out.  Please be aware there is a 10-14 day turnaround time.  You will need to sign a release of information ([place]) form if your paperwork does not come with one.  You may call the Forms Department with any questions at [phone].  Their fax number is [phone].

## 2024-08-08 ENCOUNTER — OFFICE VISIT (OUTPATIENT)
Dept: PHYSICAL THERAPY | Facility: HOSPITAL | Age: 64
End: 2024-08-08
Attending: Other
Payer: MEDICARE

## 2024-08-08 PROCEDURE — 97110 THERAPEUTIC EXERCISES: CPT

## 2024-08-08 PROCEDURE — 97140 MANUAL THERAPY 1/> REGIONS: CPT

## 2024-08-08 NOTE — PROGRESS NOTES
Diagnosis:   Bilateral sciatica (M54.31,M54.32)      Referring Provider: Ady Penn  Date of Evaluation:     7/19/2024    Precautions:  None Next MD visit:   none scheduled  Date of Surgery: n/a   Insurance Primary/Secondary: MEDICARE / COMMERCIAL     # Auth Visits: 8            Subjective: Pt reports hamstring symptoms are unchanged. He followed up with neurology yesterday and was diagnosed with Parkinson's disease. They started him on the medication.     Pain: only pain while sitting in the car      Objective:   Lumbar AROM: (* denotes performed with pain)  Flexion: 40 deg  Extension: 10 deg   Sidebending: R WNL; L WNL  Rotation: R WNL; L WNL     Hip AROM/PROM:   ER: R 28 deg, L 35 deg  IR: 35 deg R/L      Accessory motion: stiff end feel with PA's to lumbar spine, UPA's WNL  Palpation: no increased tension to glute med, piriformis     Strength: (* denotes performed with pain)  LE   Hip flexion (L2): R 5/5; L 5/5  Knee Flexion: R 5/5; L 5/5            Knee extension (L3): R 5/5; L 5/5            DF (L4): R 5/5; L 5/5  Great Toe Ext (L5): R 5/5, L 5/5  PF (S1): R 5/5; L 5/5         Special tests:   Slump: negative   THOMAS: 33 cm R, 32 cm L  NAHED: negative R/L   SLR: L 45 deg, R 55 deg       Assessment: Pt has good participation in therapy session. Symptoms continue to happen only while driving in car. Educated pt on purchase and proper use of lumbar roll to help with symptoms. Pt has tenderness to UPA at R L5 today. Completed gr III-IV mobilizations to improve joint mobility at this spot. Followed up with lumbar stretches to improve tension and mobility. Continue per plan of care.       Goals:   (to be met in 8 visits)   - Pt will improve hamstring length bilaterally by 5 deg or more to reduce pain with prolonged sitting. Progressing, 8/8/2024  - Pt will have WFL lumbar joint mobility to improve lumbar flexion AROM. Progressing, 8/8/2024  - Pt will be able to drive 15 min or more without back pain to improve  community access.Progressing, 8/8/2024  - Pt will be independent with HEP to maintain gains made in therapy. Progressing, 8/8/2024    Plan: Continue per plan of care.   Date: 7/23/2024  TX#: 2/8 Date:7/25/2024                TX#: 3/8 Date:8/8/2024              TX#: 4/8 Date:                 TX#: 5/ Date:   Tx#: 6/   Therex: 25 min  Warm up on bike, level 1, 5 min  LTR on swiss ball, 2x10   Hip/knee flexion with swiss ball, x20   Hamstring stretch with strap, 3x30 sec R/L  Side stepping in // bars with yellow band at ankles, 3 laps   Standing hip ext with yellow band, 2x10 R/L  Therex: 30 min  Warm up on bike, level 1, 5 min  LTR on swiss ball, 2x10   Hip/knee flexion with swiss ball, x20   Hamstring stretch with strap, 3x30 sec R/L  Bridge, 2x10   Trunk flexion with swiss ball stretch, x10 Therex: 30 min  Warm up on bike, level 1, 6 min  Hamstring stretch with strap, 3x30 sec R/L  LTR, 2x10  Bridge, 2x20    Trunk flexion with swiss ball stretch, x15  Shuttle press, #62, 2x10      Manual: 15 min  Central PA's to lumbar spine, x10 each level, gr III-IV  Transverse PA's to lumbar spine, x10 R/L  Lateral hip glide, 2x10, gr III-IV, R/L   Manual: 15 min  Central PA's to lumbar spine,2 x10 each level, gr III-IV  Lateral hip glide, 2x10, gr III-IV, R/L  Long axis distraction, x15 R/L Manual: 15 min  Central PA's to lumbar spine, 2x10 each level, gr III-IV  UPA to L4/5, R, 2x10  Lateral hip glide, 2x10, gr III-IV, R/L  Inferior hip glide, gr III-IV, 2x10 R/L                   HEP:   Access Code: [email or web address]  URL: [email or web address]  Date: 07/19/2024  Prepared by: May Sahu     Exercises  - Supine Hamstring Stretch with Strap  - 1 x daily - 7 x weekly - 1-2 sets - 3 reps - 30 sec hold  - Supine Lower Trunk Rotation  - 1 x daily - 7 x weekly - 2 sets - 10 reps  - Gastroc Stretch on Wall  - 1 x daily - 7 x weekly - 1-2 sets - 3 reps - 15 sec hold    Charges: TE 2 Manual 1       Total Timed Treatment: 45  min  Total Treatment Time: 45 min

## 2024-08-13 ENCOUNTER — OFFICE VISIT (OUTPATIENT)
Dept: PHYSICAL THERAPY | Facility: HOSPITAL | Age: 64
End: 2024-08-13
Attending: Other
Payer: MEDICARE

## 2024-08-13 PROCEDURE — 97110 THERAPEUTIC EXERCISES: CPT

## 2024-08-13 PROCEDURE — 97140 MANUAL THERAPY 1/> REGIONS: CPT

## 2024-08-13 NOTE — PROGRESS NOTES
Diagnosis:   Bilateral sciatica (M54.31,M54.32)      Referring Provider: Ady Penn  Date of Evaluation:     7/19/2024    Precautions:  None Next MD visit:   none scheduled  Date of Surgery: n/a   Insurance Primary/Secondary: MEDICARE / COMMERCIAL     # Auth Visits: 8            Subjective: Pt got the lumbar roll and has been trying it out. He has not seen a major changes so far but will continue to use.     Pain: only pain while sitting in the car      Objective:   Lumbar AROM: (* denotes performed with pain)  Flexion: 40 deg  Extension: 10 deg   Sidebending: R WNL; L WNL  Rotation: R WNL; L WNL     Hip AROM/PROM:   ER: R 28 deg, L 35 deg  IR: 35 deg R/L      Accessory motion: stiff end feel with PA's to lumbar spine, UPA's WNL  Palpation: no increased tension to glute med, piriformis     Strength: (* denotes performed with pain)  LE   Hip flexion (L2): R 5/5; L 5/5  Knee Flexion: R 5/5; L 5/5            Knee extension (L3): R 5/5; L 5/5            DF (L4): R 5/5; L 5/5  Great Toe Ext (L5): R 5/5, L 5/5  PF (S1): R 5/5; L 5/5         Special tests:   Slump: negative   THOMAS: 33 cm R, 32 cm L  NAHED: negative R/L   SLR: L 45 deg, R 55 deg     8/13/2024  - Progressed bridges on swiss ball  - Pain with UPA at R L4/5    Assessment: Pt continues to report unchanged LBP while driving car. Just got lumbar roll, recommended continued use and adjustment as needed. Pt has tenderness and pain with mobilization to L4/5 segment on the R side, no pain on the left. Pt able to follow up with opening exercises for low back and strengthening. Initiated clamshells for glute med strengthening, pt has good form with exercise. Pt would benefit from continued skilled intervention to achieve goals and reduce pain while driving.       Goals:   (to be met in 8 visits)   - Pt will improve hamstring length bilaterally by 5 deg or more to reduce pain with prolonged sitting. Progressing, 8/8/2024  - Pt will have WFL lumbar joint mobility to  improve lumbar flexion AROM. Progressing, 8/8/2024  - Pt will be able to drive 15 min or more without back pain to improve community access.Progressing, 8/8/2024  - Pt will be independent with HEP to maintain gains made in therapy. Progressing, 8/8/2024    Plan: Continue per plan of care.   Date: 7/23/2024  TX#: 2/8 Date:7/25/2024                TX#: 3/8 Date:8/8/2024              TX#: 4/8 Date: 8/13/2024              TX#: 5/8 Date:   Tx#: 6/   Therex: 25 min  Warm up on bike, level 1, 5 min  LTR on swiss ball, 2x10   Hip/knee flexion with swiss ball, x20   Hamstring stretch with strap, 3x30 sec R/L  Side stepping in // bars with yellow band at ankles, 3 laps   Standing hip ext with yellow band, 2x10 R/L  Therex: 30 min  Warm up on bike, level 1, 5 min  LTR on swiss ball, 2x10   Hip/knee flexion with swiss ball, x20   Hamstring stretch with strap, 3x30 sec R/L  Bridge, 2x10   Trunk flexion with swiss ball stretch, x10 Therex: 30 min  Warm up on bike, level 1, 6 min  Hamstring stretch with strap, 3x30 sec R/L  LTR, 2x10  Bridge, 2x20    Trunk flexion with swiss ball stretch, x15  Shuttle press, #62, 2x10  Therex: 25 min  Warm up on bike, level 1, 6 min  DKTC with swiss ball, x20   Bridge on swiss ball, 2x10   Clamshells, x20 R/L   DLHR, x20  Paloff press with blue band, x20 R/L   Shuttle press, #62, 3x10     Manual: 15 min  Central PA's to lumbar spine, x10 each level, gr III-IV  Transverse PA's to lumbar spine, x10 R/L  Lateral hip glide, 2x10, gr III-IV, R/L   Manual: 15 min  Central PA's to lumbar spine,2 x10 each level, gr III-IV  Lateral hip glide, 2x10, gr III-IV, R/L  Long axis distraction, x15 R/L Manual: 15 min  Central PA's to lumbar spine, 2x10 each level, gr III-IV  UPA to L4/5, R, 2x10  Lateral hip glide, 2x10, gr III-IV, R/L  Inferior hip glide, gr III-IV, 2x10 R/L Manual: 20 min  UPA to L3/4/5, R side, multiple reps  Lateral hip glide, 2x10, gr III-IV, R/L  Inferior hip glide, gr III-IV, 2x10 R/L  Long  axis distraction, x10 R/L                   HEP:   Access Code: [email or web address]  URL: [email or web address]  Date: 07/19/2024  Prepared by: May Sahu     Exercises  - Supine Hamstring Stretch with Strap  - 1 x daily - 7 x weekly - 1-2 sets - 3 reps - 30 sec hold  - Supine Lower Trunk Rotation  - 1 x daily - 7 x weekly - 2 sets - 10 reps  - Gastroc Stretch on Wall  - 1 x daily - 7 x weekly - 1-2 sets - 3 reps - 15 sec hold    Charges: TE 2 Manual 1       Total Timed Treatment: 45 min  Total Treatment Time: 45 min

## 2024-08-15 ENCOUNTER — OFFICE VISIT (OUTPATIENT)
Dept: PHYSICAL THERAPY | Facility: HOSPITAL | Age: 64
End: 2024-08-15
Attending: Other
Payer: MEDICARE

## 2024-08-15 PROCEDURE — 97140 MANUAL THERAPY 1/> REGIONS: CPT

## 2024-08-15 PROCEDURE — 97110 THERAPEUTIC EXERCISES: CPT

## 2024-08-15 NOTE — PROGRESS NOTES
Diagnosis:   Bilateral sciatica (M54.31,M54.32)      Referring Provider: Ady Penn  Date of Evaluation:     7/19/2024    Precautions:  None Next MD visit:   none scheduled  Date of Surgery: n/a   Insurance Primary/Secondary: MEDICARE / COMMERCIAL     # Auth Visits: 8            Subjective: Pt is doing well today, back still hurt on the way to therapy today.     Pain: only pain while sitting in the car      Objective:   Lumbar AROM: (* denotes performed with pain)  Flexion: 40 deg  Extension: 10 deg   Sidebending: R WNL; L WNL  Rotation: R WNL; L WNL     Hip AROM/PROM:   ER: R 28 deg, L 35 deg  IR: 35 deg R/L      Accessory motion: stiff end feel with PA's to lumbar spine, UPA's WNL  Palpation: no increased tension to glute med, piriformis     Strength: (* denotes performed with pain)  LE   Hip flexion (L2): R 5/5; L 5/5  Knee Flexion: R 5/5; L 5/5            Knee extension (L3): R 5/5; L 5/5            DF (L4): R 5/5; L 5/5  Great Toe Ext (L5): R 5/5, L 5/5  PF (S1): R 5/5; L 5/5         Special tests:   Slump: negative   THOMAS: 33 cm R, 32 cm L  NAHED: negative R/L   SLR: L 45 deg, R 55 deg     8/13/2024  - Progressed bridges on swiss ball  - Pain with UPA at R L4/5    Assessment: Pt has excellent participation in therapy sessions, however continue low back symptoms while driving car (only thing that brings on symptoms). Today, attempted other exercises for lumbar mobility/opening. Pt able to complete pelvic tilts in sitting, min verbal and tactile cues for form throughout activity, but overall good lumbar mobility noted with exercise. Pt completes figure 4 stretch with more tightness noted in R vs L. Continued with BLE strengthening as tolerated, continue per plan of care to achieve goals and centralize low back symptoms.     Goals:   (to be met in 8 visits)   - Pt will improve hamstring length bilaterally by 5 deg or more to reduce pain with prolonged sitting. Progressing, 8/8/2024  - Pt will have WFL lumbar  joint mobility to improve lumbar flexion AROM. Progressing, 8/8/2024  - Pt will be able to drive 15 min or more without back pain to improve community access.Progressing, 8/8/2024  - Pt will be independent with HEP to maintain gains made in therapy. Progressing, 8/8/2024    Plan: Continue per plan of care.   Date: 7/23/2024  TX#: 2/8 Date:7/25/2024                TX#: 3/8 Date:8/8/2024              TX#: 4/8 Date: 8/13/2024              TX#: 5/8 Date: 8/15/2024  Tx#: 6/8   Therex: 25 min  Warm up on bike, level 1, 5 min  LTR on swiss ball, 2x10   Hip/knee flexion with swiss ball, x20   Hamstring stretch with strap, 3x30 sec R/L  Side stepping in // bars with yellow band at ankles, 3 laps   Standing hip ext with yellow band, 2x10 R/L  Therex: 30 min  Warm up on bike, level 1, 5 min  LTR on swiss ball, 2x10   Hip/knee flexion with swiss ball, x20   Hamstring stretch with strap, 3x30 sec R/L  Bridge, 2x10   Trunk flexion with swiss ball stretch, x10 Therex: 30 min  Warm up on bike, level 1, 6 min  Hamstring stretch with strap, 3x30 sec R/L  LTR, 2x10  Bridge, 2x20    Trunk flexion with swiss ball stretch, x15  Shuttle press, #62, 2x10  Therex: 25 min  Warm up on bike, level 1, 6 min  DKTC with swiss ball, x20   Bridge on swiss ball, 2x10   Clamshells, x20 R/L   DLHR, x20  Paloff press with blue band, x20 R/L   Shuttle press, #62, 3x10  Therex: 25 min  Warm up on bike, level 1, 6 min  Supine figure 4 stretch, 3x30 sec   Hamstring stretch with strap, 3x30 sec R/L  Bridge on swiss ball, 2x10   Clamshells, x20 R/L  Child's pose stretch, 10x5 sec    Pelvic tilts, x10   Shuttle press, #62, 2x10    Manual: 15 min  Central PA's to lumbar spine, x10 each level, gr III-IV  Transverse PA's to lumbar spine, x10 R/L  Lateral hip glide, 2x10, gr III-IV, R/L   Manual: 15 min  Central PA's to lumbar spine,2 x10 each level, gr III-IV  Lateral hip glide, 2x10, gr III-IV, R/L  Long axis distraction, x15 R/L Manual: 15 min  Central PA's to  lumbar spine, 2x10 each level, gr III-IV  UPA to L4/5, R, 2x10  Lateral hip glide, 2x10, gr III-IV, R/L  Inferior hip glide, gr III-IV, 2x10 R/L Manual: 20 min  UPA to L3/4/5, R side, multiple reps  Lateral hip glide, 2x10, gr III-IV, R/L  Inferior hip glide, gr III-IV, 2x10 R/L  Long axis distraction, x10 R/L  Manual: 20 min  UPA to L3/4/5, R side, multiple reps  Inferior hip glide, gr III-IV, 2x10 R/L  Long axis distraction, x10 R/L                  HEP:   Access Code: [email or web address]  URL: [email or web address]  Date: 07/19/2024  Prepared by: May Sahu     Exercises  - Supine Hamstring Stretch with Strap  - 1 x daily - 7 x weekly - 1-2 sets - 3 reps - 30 sec hold  - Supine Lower Trunk Rotation  - 1 x daily - 7 x weekly - 2 sets - 10 reps  - Gastroc Stretch on Wall  - 1 x daily - 7 x weekly - 1-2 sets - 3 reps - 15 sec hold    Charges: TE 2 Manual 1       Total Timed Treatment: 45 min  Total Treatment Time: 45 min

## 2024-08-20 ENCOUNTER — OFFICE VISIT (OUTPATIENT)
Dept: PHYSICAL THERAPY | Facility: HOSPITAL | Age: 64
End: 2024-08-20
Attending: Other
Payer: MEDICARE

## 2024-08-20 PROCEDURE — 97110 THERAPEUTIC EXERCISES: CPT

## 2024-08-20 PROCEDURE — 97140 MANUAL THERAPY 1/> REGIONS: CPT

## 2024-08-20 NOTE — PROGRESS NOTES
Diagnosis:   Bilateral sciatica (M54.31,M54.32)      Referring Provider: Ady Penn  Date of Evaluation:     7/19/2024    Precautions:  None Next MD visit:   none scheduled  Date of Surgery: n/a   Insurance Primary/Secondary: MEDICARE / COMMERCIAL     # Auth Visits: 8            Subjective: Pt reports symptoms are still unchanged when driving the car. Has also been wearing an SI belt and this helps the back but not the posterior leg pain.     Pain: only pain while sitting in the car      Objective:   Lumbar AROM: (* denotes performed with pain)  Flexion: 40 deg  Extension: 10 deg   Sidebending: R WNL; L WNL  Rotation: R WNL; L WNL     Hip AROM/PROM:   ER: R 28 deg, L 35 deg  IR: 35 deg R/L      Accessory motion: stiff end feel with PA's to lumbar spine, UPA's WNL  Palpation: no increased tension to glute med, piriformis     Strength: (* denotes performed with pain)  LE   Hip flexion (L2): R 5/5; L 5/5  Knee Flexion: R 5/5; L 5/5            Knee extension (L3): R 5/5; L 5/5            DF (L4): R 5/5; L 5/5  Great Toe Ext (L5): R 5/5, L 5/5  PF (S1): R 5/5; L 5/5         Special tests:   Slump: negative   THOMAS: 33 cm R, 32 cm L  NAHED: negative R/L   SLR: L 45 deg, R 55 deg     8/13/2024  - Progressed bridges on swiss ball  - Pain with UPA at R L4/5    8/20/2024  - SI joint test cluster: all negative   - SLR: negative for neural tension, 45 deg R/L    Assessment: Assessed SI joint for possible involvement in back symptoms while driving, SI test cluster negative for reproduction of symptoms. SLR is negative for neural tension, however hamstring length continues to be limited bilaterally. Pt has no pain to PA assessment of sacral spine, however tenderness continues in L3/4 segments. Pt able to complete all exercises in session with no adverse reaction. Added sciatic nerve flossing, pt reports some pulling at back of knee. Educated pt on completing this before driving in the car and seeing if this helps. Pt would  benefit from continued skilled intervention to achieve goals.     Goals:   (to be met in 8 visits)   - Pt will improve hamstring length bilaterally by 5 deg or more to reduce pain with prolonged sitting. Progressing, 8/8/2024  - Pt will have WFL lumbar joint mobility to improve lumbar flexion AROM. Progressing, 8/8/2024  - Pt will be able to drive 15 min or more without back pain to improve community access.Progressing, 8/8/2024  - Pt will be independent with HEP to maintain gains made in therapy. Progressing, 8/8/2024    Plan: Continue per plan of care. Next session: PN  Date: 7/23/2024  TX#: 2/8 Date:7/25/2024                TX#: 3/8 Date:8/8/2024              TX#: 4/8 Date: 8/13/2024              TX#: 5/8 Date: 8/15/2024  Tx#: 6/8 Date: 8/20/2024   Tx#: 7/8   Therex: 25 min  Warm up on bike, level 1, 5 min  LTR on swiss ball, 2x10   Hip/knee flexion with swiss ball, x20   Hamstring stretch with strap, 3x30 sec R/L  Side stepping in // bars with yellow band at ankles, 3 laps   Standing hip ext with yellow band, 2x10 R/L  Therex: 30 min  Warm up on bike, level 1, 5 min  LTR on swiss ball, 2x10   Hip/knee flexion with swiss ball, x20   Hamstring stretch with strap, 3x30 sec R/L  Bridge, 2x10   Trunk flexion with swiss ball stretch, x10 Therex: 30 min  Warm up on bike, level 1, 6 min  Hamstring stretch with strap, 3x30 sec R/L  LTR, 2x10  Bridge, 2x20    Trunk flexion with swiss ball stretch, x15  Shuttle press, #62, 2x10  Therex: 25 min  Warm up on bike, level 1, 6 min  DKTC with swiss ball, x20   Bridge on swiss ball, 2x10   Clamshells, x20 R/L   DLHR, x20  Paloff press with blue band, x20 R/L   Shuttle press, #62, 3x10  Therex: 25 min  Warm up on bike, level 1, 6 min  Supine figure 4 stretch, 3x30 sec   Hamstring stretch with strap, 3x30 sec R/L  Bridge on swiss ball, 2x10   Clamshells, x20 R/L  Child's pose stretch, 10x5 sec    Pelvic tilts, x10   Shuttle press, #62, 2x10  Therex: 25 min  Warm up on bike, level  1, 6 min  Bridge on swiss ball, 3x10   SI joint muscle energy, x10 each way, 5 sec holds  Sciatic nerve flossing, 2x10 R/L  Side stepping with yellow band, 4x10'   Shuttle press, #75, 2x10      Manual: 15 min  Central PA's to lumbar spine, x10 each level, gr III-IV  Transverse PA's to lumbar spine, x10 R/L  Lateral hip glide, 2x10, gr III-IV, R/L   Manual: 15 min  Central PA's to lumbar spine,2 x10 each level, gr III-IV  Lateral hip glide, 2x10, gr III-IV, R/L  Long axis distraction, x15 R/L Manual: 15 min  Central PA's to lumbar spine, 2x10 each level, gr III-IV  UPA to L4/5, R, 2x10  Lateral hip glide, 2x10, gr III-IV, R/L  Inferior hip glide, gr III-IV, 2x10 R/L Manual: 20 min  UPA to L3/4/5, R side, multiple reps  Lateral hip glide, 2x10, gr III-IV, R/L  Inferior hip glide, gr III-IV, 2x10 R/L  Long axis distraction, x10 R/L  Manual: 20 min  UPA to L3/4/5, R side, multiple reps  Inferior hip glide, gr III-IV, 2x10 R/L  Long axis distraction, x10 R/L  Manual: 15 min  UPA to L3/4/5, R side, multiple reps, gr IV  Central PA's to sacral spine multiple bouts, gr IV  Inferior hip glide, gr III-IV, 2x10 R/L                   HEP:   Access Code: [email or web address]  URL: [email or web address]  Date: 08/20/2024  Prepared by: May Sahu    Exercises  - Supine Hamstring Stretch with Strap  - 1 x daily - 7 x weekly - 1-2 sets - 3 reps - 30 sec hold  - Supine Lower Trunk Rotation  - 1 x daily - 7 x weekly - 2 sets - 10 reps  - Gastroc Stretch on Wall  - 1 x daily - 7 x weekly - 1-2 sets - 3 reps - 15 sec hold  - Supine Sciatic Nerve Glide  - 1 x daily - 7 x weekly - 2 sets - 10 reps    Charges: TE 2 Manual 1       Total Timed Treatment: 40 min  Total Treatment Time: 40 min

## 2024-08-22 ENCOUNTER — APPOINTMENT (OUTPATIENT)
Dept: PHYSICAL THERAPY | Facility: HOSPITAL | Age: 64
End: 2024-08-22
Attending: Other
Payer: MEDICARE

## 2024-08-27 ENCOUNTER — OFFICE VISIT (OUTPATIENT)
Dept: PHYSICAL THERAPY | Facility: HOSPITAL | Age: 64
End: 2024-08-27
Attending: Other
Payer: MEDICARE

## 2024-08-27 PROCEDURE — 97110 THERAPEUTIC EXERCISES: CPT

## 2024-08-27 PROCEDURE — 97140 MANUAL THERAPY 1/> REGIONS: CPT

## 2024-08-27 NOTE — PROGRESS NOTES
Diagnosis:   Bilateral sciatica (M54.31,M54.32)      Referring Provider: Ady Penn  Date of Evaluation:     7/19/2024    Precautions:  None Next MD visit:   none scheduled  Date of Surgery: n/a   Insurance Primary/Secondary: MEDICARE / COMMERCIAL     # Auth Visits: 8           Progress Summary  Pt has attended 8 visits in Physical Therapy.      Subjective: Pt reports since last session he did injure the ankle. He had to go to prompt care, x-rays are all negative, so they are thinking a ligament sprain. Mostly hurts when he's going up and down stairs. With the leg pain, he was able to make it a full 40 minutes in the car, he has been trying to go to the sauna and stretch there. Noted recent naproxen use for ankle.     Pain: only pain while sitting in the car      Objective:   Lumbar AROM: (* denotes performed with pain)  Flexion: 60 deg  Extension: 10 deg   Sidebending: R WNL; L WNL  Rotation: R WNL; L WNL     Hip AROM/PROM:   ER: R 32 deg, L 40 deg  IR: 35 deg R/L      Accessory motion: stiff end feel with PA's to lumbar spine, UPA's WNL  Palpation: no increased tension to glute med, piriformis     Strength: (* denotes performed with pain)  LE   Hip flexion (L2): R 5/5; L 5/5  Knee Flexion: R 5/5; L 5/5            Knee extension (L3): R 5/5; L 5/5            DF (L4): R 5/5; L 5/5  Great Toe Ext (L5): R 5/5, L 5/5  PF (S1): R 5/5; L 5/5         Special tests:   Slump: negative   THOMAS: limited R>L  NAHED: negative R/L   SLR: L 60 deg, R 55 deg     Assessment: Pt has attended 8 visits in outpatient physical therapy. In this time, pt has been able to improve lumbar spine mobility, hip mobility and hamstring length on the LLE. Pt has improved symptoms today while driving to therapy session, however continues to have symptoms bilaterally by end of drive. Completed log roll mobilization for lumbar opening for reduction of distal symptoms. Pt tolerates and reports good stretch with activity. Due to ongoing symptoms, pt  would benefit from continued skilled intervention to ensure continued improvement of symptoms.     Goals:   (to be met in 8 visits)   - Pt will improve hamstring length bilaterally by 5 deg or more to reduce pain with prolonged sitting. Progressing, 8/27/2024  - Pt will have WFL lumbar joint mobility to improve lumbar flexion AROM. MET.   - Pt will be able to drive 15 min or more without back pain to improve community access. MET   - Updated to 30 minutes or more.   - Pt will be independent with HEP to maintain gains made in therapy. Progressing, 8/27/2024     Plan: Continue skilled Physical Therapy 1 x/week or a total of 3 additional visits over a 90 day period.   Patient/Family/Caregiver was advised of these findings, precautions, and treatment options and has agreed to actively participate in planning and for this course of care.    Thank you for your referral. If you have any questions, please contact me at Dept: [phone].    Sincerely,  Electronically signed by therapist: May Sahu PT, DPT    Certification From: 8/27/2024  To:11/25/2024    Date: 7/23/2024  TX#: 2/8 Date:7/25/2024                TX#: 3/8 Date:8/8/2024              TX#: 4/8 Date: 8/13/2024              TX#: 5/8 Date: 8/15/2024  Tx#: 6/8 Date: 8/20/2024   Tx#: 7/8 Date: 8/27/2024  Tx#: 8/8   Therex: 25 min  Warm up on bike, level 1, 5 min  LTR on swiss ball, 2x10   Hip/knee flexion with swiss ball, x20   Hamstring stretch with strap, 3x30 sec R/L  Side stepping in // bars with yellow band at ankles, 3 laps   Standing hip ext with yellow band, 2x10 R/L  Therex: 30 min  Warm up on bike, level 1, 5 min  LTR on swiss ball, 2x10   Hip/knee flexion with swiss ball, x20   Hamstring stretch with strap, 3x30 sec R/L  Bridge, 2x10   Trunk flexion with swiss ball stretch, x10 Therex: 30 min  Warm up on bike, level 1, 6 min  Hamstring stretch with strap, 3x30 sec R/L  LTR, 2x10  Bridge, 2x20    Trunk flexion with swiss ball stretch, x15  Shuttle press,  #62, 2x10  Therex: 25 min  Warm up on bike, level 1, 6 min  DKTC with swiss ball, x20   Bridge on swiss ball, 2x10   Clamshells, x20 R/L   DLHR, x20  Paloff press with blue band, x20 R/L   Shuttle press, #62, 3x10  Therex: 25 min  Warm up on bike, level 1, 6 min  Supine figure 4 stretch, 3x30 sec   Hamstring stretch with strap, 3x30 sec R/L  Bridge on swiss ball, 2x10   Clamshells, x20 R/L  Child's pose stretch, 10x5 sec    Pelvic tilts, x10   Shuttle press, #62, 2x10  Therex: 25 min  Warm up on bike, level 1, 6 min  Bridge on swiss ball, 3x10   SI joint muscle energy, x10 each way, 5 sec holds  Sciatic nerve flossing, 2x10 R/L  Side stepping with yellow band, 4x10'   Shuttle press, #75, 2x10    Therex: 35 min  Warm up on bike, level 1, 6 min  Reassessment in objective  Cat/cows, 2x8  Bridge on swiss ball, 2x10   Shuttle press, #75, 3x10   Side stepping with yellow band, 4x10'  Pt education: updated HEP and how often to complete.    Manual: 15 min  Central PA's to lumbar spine, x10 each level, gr III-IV  Transverse PA's to lumbar spine, x10 R/L  Lateral hip glide, 2x10, gr III-IV, R/L   Manual: 15 min  Central PA's to lumbar spine,2 x10 each level, gr III-IV  Lateral hip glide, 2x10, gr III-IV, R/L  Long axis distraction, x15 R/L Manual: 15 min  Central PA's to lumbar spine, 2x10 each level, gr III-IV  UPA to L4/5, R, 2x10  Lateral hip glide, 2x10, gr III-IV, R/L  Inferior hip glide, gr III-IV, 2x10 R/L Manual: 20 min  UPA to L3/4/5, R side, multiple reps  Lateral hip glide, 2x10, gr III-IV, R/L  Inferior hip glide, gr III-IV, 2x10 R/L  Long axis distraction, x10 R/L  Manual: 20 min  UPA to L3/4/5, R side, multiple reps  Inferior hip glide, gr III-IV, 2x10 R/L  Long axis distraction, x10 R/L  Manual: 15 min  UPA to L3/4/5, R side, multiple reps, gr IV  Central PA'ms to sacral spine multiple bouts, gr IV  Inferior hip glide, gr III-IV, 2x10 R/L Manual: 10 min  Log roll mobilization, R/L, x10 each, gr III                      HEP:   Access Code: [email or web address]  URL: [email or web address]  Date: 08/27/2024  Prepared by: May Sahu    Exercises  - Supine Hamstring Stretch with Strap  - 1 x daily - 7 x weekly - 1-2 sets - 3 reps - 30 sec hold  - Supine Lower Trunk Rotation  - 1 x daily - 7 x weekly - 2 sets - 10 reps  - Gastroc Stretch on Wall  - 1 x daily - 7 x weekly - 1-2 sets - 3 reps - 15 sec hold  - Supine Sciatic Nerve Glide  - 1 x daily - 7 x weekly - 2 sets - 10 reps  - Side Stepping with Resistance at Ankles  - 1 x daily - 7 x weekly - 3 sets - 10 reps  - Cat Cow  - 1 x daily - 7 x weekly - 2 sets - 10 reps    Charges: TE 2 Manual 1       Total Timed Treatment: 45 min  Total Treatment Time: 45 min

## 2024-08-29 ENCOUNTER — APPOINTMENT (OUTPATIENT)
Dept: PHYSICAL THERAPY | Facility: HOSPITAL | Age: 64
End: 2024-08-29
Attending: Other
Payer: MEDICARE

## 2024-09-03 ENCOUNTER — APPOINTMENT (OUTPATIENT)
Dept: PHYSICAL THERAPY | Facility: HOSPITAL | Age: 64
End: 2024-09-03
Attending: Other
Payer: MEDICARE

## 2024-09-05 ENCOUNTER — APPOINTMENT (OUTPATIENT)
Dept: PHYSICAL THERAPY | Facility: HOSPITAL | Age: 64
End: 2024-09-05
Attending: Other
Payer: MEDICARE

## 2024-09-09 ENCOUNTER — OFFICE VISIT (OUTPATIENT)
Dept: PHYSICAL THERAPY | Facility: HOSPITAL | Age: 64
End: 2024-09-09
Attending: Other
Payer: MEDICARE

## 2024-09-09 PROCEDURE — 97110 THERAPEUTIC EXERCISES: CPT

## 2024-09-09 PROCEDURE — 97140 MANUAL THERAPY 1/> REGIONS: CPT

## 2024-09-09 NOTE — PROGRESS NOTES
Diagnosis:   Bilateral sciatica (M54.31,M54.32)      Referring Provider: Ady Penn  Date of Evaluation:     7/19/2024    Precautions:  None Next MD visit:   none scheduled  Date of Surgery: n/a   Insurance Primary/Secondary: MEDICARE / COMMERCIAL     # Auth Visits: 8            Subjective: Pt reports he was able to drive all the way here today without having pain making him stop. Only felt some pain today in the R buttock at the very end of the drive.      Pain: no pain at rest      Objective:   Lumbar AROM: (* denotes performed with pain)  Flexion: 60 deg  Extension: 10 deg   Sidebending: R WNL; L WNL  Rotation: R WNL; L WNL     Hip AROM/PROM:   ER: R 32 deg, L 40 deg  IR: 35 deg R/L      Accessory motion: stiff end feel with PA's to lumbar spine, UPA's WNL  Palpation: no increased tension to glute med, piriformis     Strength: (* denotes performed with pain)  LE   Hip flexion (L2): R 5/5; L 5/5  Knee Flexion: R 5/5; L 5/5            Knee extension (L3): R 5/5; L 5/5            DF (L4): R 5/5; L 5/5  Great Toe Ext (L5): R 5/5, L 5/5  PF (S1): R 5/5; L 5/5         Special tests:   Slump: negative   THOMAS: limited R>L  NAHED: negative R/L   SLR: L 60 deg, R 55 deg     Assessment: Pt subjectively reports continued centralization of distal symptoms, with only some pain into the right buttock with driving today. Pt able to drive through 40 minutes to attend session without having to stop due to pain. Continued with log roll mobilization as this seems to be making the greatest difference in symptoms. Pt feels good stretch with mobilization. Pt able to continue with strengthening activities for hip and glutes. Cat/cow form improving with lumbar spine mobility, continued cues provided especially for lumbar flexion AROM. Continue per plan of care to achieve functional goals.     Goals:   (to be met in 8 visits)   - Pt will improve hamstring length bilaterally by 5 deg or more to reduce pain with prolonged sitting.  Progressing, 8/27/2024  - Pt will have WFL lumbar joint mobility to improve lumbar flexion AROM. MET.   - Pt will be able to drive 15 min or more without back pain to improve community access. MET   - Updated to 30 minutes or more.   - Pt will be independent with HEP to maintain gains made in therapy. Progressing, 8/27/2024     Plan: Continue per plan of care.     Date: 8/13/2024              TX#: 5/8 Date: 8/15/2024  Tx#: 6/8 Date: 8/20/2024   Tx#: 7/8 Date: 8/27/2024  Tx#: 8/8 Date: 9/9/2024  Tx#: 9/11   Therex: 25 min  Warm up on bike, level 1, 6 min  DKTC with swiss ball, x20   Bridge on swiss ball, 2x10   Clamshells, x20 R/L   DLHR, x20  Paloff press with blue band, x20 R/L   Shuttle press, #62, 3x10  Therex: 25 min  Warm up on bike, level 1, 6 min  Supine figure 4 stretch, 3x30 sec   Hamstring stretch with strap, 3x30 sec R/L  Bridge on swiss ball, 2x10   Clamshells, x20 R/L  Child's pose stretch, 10x5 sec    Pelvic tilts, x10   Shuttle press, #62, 2x10  Therex: 25 min  Warm up on bike, level 1, 6 min  Bridge on swiss ball, 3x10   SI joint muscle energy, x10 each way, 5 sec holds  Sciatic nerve flossing, 2x10 R/L  Side stepping with yellow band, 4x10'   Shuttle press, #75, 2x10    Therex: 35 min  Warm up on bike, level 1, 6 min  Reassessment in objective  Cat/cows, 2x8  Bridge on swiss ball, 2x10   Shuttle press, #75, 3x10   Side stepping with yellow band, 4x10'  Pt education: updated HEP and how often to complete.  Therex: 30 min  Warm up on bike, level 3, 5 min  Cat/cows, 2x10  Bridge on swiss ball, 2x10   Shuttle press, #75, 3x10   Side stepping with red band, 4x10'  Monster walk with red band, fwd/bkwd, 3 laps  SLHR, x20 R/L   Hamstring stretch with strap, 3x30 sec R/L   Manual: 20 min  UPA to L3/4/5, R side, multiple reps  Lateral hip glide, 2x10, gr III-IV, R/L  Inferior hip glide, gr III-IV, 2x10 R/L  Long axis distraction, x10 R/L  Manual: 20 min  UPA to L3/4/5, R side, multiple reps  Inferior hip  glide, gr III-IV, 2x10 R/L  Long axis distraction, x10 R/L  Manual: 15 min  UPA to L3/4/5, R side, multiple reps, gr IV  Central PA'ms to sacral spine multiple bouts, gr IV  Inferior hip glide, gr III-IV, 2x10 R/L Manual: 10 min  Log roll mobilization, R/L, x10 each, gr III Manual: 15 min  Log roll mobilization, R/L, x10 each, gr IV  Central PA's to lumbar spine focused on L4-S2                 HEP:   Access Code: [email or web address]  URL: [email or web address]  Date: 08/27/2024  Prepared by: May Sahu    Exercises  - Supine Hamstring Stretch with Strap  - 1 x daily - 7 x weekly - 1-2 sets - 3 reps - 30 sec hold  - Supine Lower Trunk Rotation  - 1 x daily - 7 x weekly - 2 sets - 10 reps  - Gastroc Stretch on Wall  - 1 x daily - 7 x weekly - 1-2 sets - 3 reps - 15 sec hold  - Supine Sciatic Nerve Glide  - 1 x daily - 7 x weekly - 2 sets - 10 reps  - Side Stepping with Resistance at Ankles  - 1 x daily - 7 x weekly - 3 sets - 10 reps  - Cat Cow  - 1 x daily - 7 x weekly - 2 sets - 10 reps    Charges: TE 2 Manual 1       Total Timed Treatment: 45 min  Total Treatment Time: 45 min

## 2024-09-20 ENCOUNTER — OFFICE VISIT (OUTPATIENT)
Dept: PHYSICAL THERAPY | Facility: HOSPITAL | Age: 64
End: 2024-09-20
Attending: Other
Payer: MEDICARE

## 2024-09-20 PROCEDURE — 97110 THERAPEUTIC EXERCISES: CPT

## 2024-09-20 PROCEDURE — 97140 MANUAL THERAPY 1/> REGIONS: CPT

## 2024-09-20 NOTE — PROGRESS NOTES
Diagnosis:   Bilateral sciatica (M54.31,M54.32)      Referring Provider: Ady Penn  Date of Evaluation:     7/19/2024    Precautions:  None Next MD visit:   none scheduled  Date of Surgery: n/a   Insurance Primary/Secondary: MEDICARE / COMMERCIAL     # Auth Visits: 8            Subjective: Pt reports that he is doing well. Was able to drive here with no issues or pain having to stop him. 5/10 symptoms at worst when driving here.    Pain: no pain at rest      Objective:   Lumbar AROM: (* denotes performed with pain)  Flexion: 60 deg  Extension: 10 deg   Sidebending: R WNL; L WNL  Rotation: R WNL; L WNL     Hip AROM/PROM:   ER: R 32 deg, L 40 deg  IR: 35 deg R/L      Accessory motion: stiff end feel with PA's to lumbar spine, UPA's WNL  Palpation: no increased tension to glute med, piriformis     Strength: (* denotes performed with pain)  LE   Hip flexion (L2): R 5/5; L 5/5  Knee Flexion: R 5/5; L 5/5            Knee extension (L3): R 5/5; L 5/5            DF (L4): R 5/5; L 5/5  Great Toe Ext (L5): R 5/5, L 5/5  PF (S1): R 5/5; L 5/5         Special tests:   Slump: negative   THOMAS: limited R>L  NAHED: negative R/L   SLR: L 60 deg, R 55 deg     Assessment: Pt has excellent progress towards goals for PT. Continues to find relief of symptoms with log roll mobilizations bilaterally. Continued with strengthening as tolerated, added wobble board to shuttle press for increased challenge. Pt also able to complete SL shuttle press with good form and no pain. Added core strengthening with paloff press and kettle bell pass around body, good posture noted throughout. Continue per plan of care to achieve functional goals.     Goals:   (to be met in 8 visits)   - Pt will improve hamstring length bilaterally by 5 deg or more to reduce pain with prolonged sitting. Progressing, 8/27/2024  - Pt will have WFL lumbar joint mobility to improve lumbar flexion AROM. MET.   - Pt will be able to drive 15 min or more without back pain to  improve community access. MET   - Updated to 30 minutes or more.   - Pt will be independent with HEP to maintain gains made in therapy. Progressing, 8/27/2024     Plan: Continue per plan of care.     Date: 8/13/2024              TX#: 5/8 Date: 8/15/2024  Tx#: 6/8 Date: 8/20/2024   Tx#: 7/8 Date: 8/27/2024  Tx#: 8/8 Date: 9/9/2024  Tx#: 9/11 Date: 9/20/2024  Tx#: 10/11   Therex: 25 min  Warm up on bike, level 1, 6 min  DKTC with swiss ball, x20   Bridge on swiss ball, 2x10   Clamshells, x20 R/L   DLHR, x20  Paloff press with blue band, x20 R/L   Shuttle press, #62, 3x10  Therex: 25 min  Warm up on bike, level 1, 6 min  Supine figure 4 stretch, 3x30 sec   Hamstring stretch with strap, 3x30 sec R/L  Bridge on swiss ball, 2x10   Clamshells, x20 R/L  Child's pose stretch, 10x5 sec    Pelvic tilts, x10   Shuttle press, #62, 2x10  Therex: 25 min  Warm up on bike, level 1, 6 min  Bridge on swiss ball, 3x10   SI joint muscle energy, x10 each way, 5 sec holds  Sciatic nerve flossing, 2x10 R/L  Side stepping with yellow band, 4x10'   Shuttle press, #75, 2x10    Therex: 35 min  Warm up on bike, level 1, 6 min  Reassessment in objective  Cat/cows, 2x8  Bridge on swiss ball, 2x10   Shuttle press, #75, 3x10   Side stepping with yellow band, 4x10'  Pt education: updated HEP and how often to complete.  Therex: 30 min  Warm up on bike, level 3, 5 min  Cat/cows, 2x10  Bridge on swiss ball, 2x10   Shuttle press, #75, 3x10   Side stepping with red band, 4x10'  Monster walk with red band, fwd/bkwd, 3 laps  SLHR, x20 R/L   Hamstring stretch with strap, 3x30 sec R/L Therex: 30 min  Warm up on bike, level 2, 5 min  Cat/cow, x10   Shuttle press w/ wobble board, #75, 3x10  Shuttle press SL, #50, 2x10 R/L  Paloff press, #20, x20 R/L   Monster walk with red band, fwd/bkwd, 3 laps x 10'  Kettle bell pass around body, x15, #25  SLHR, x20 R/L   Hamstring stretch with strap, 3x30 sec R/L   Manual: 20 min  UPA to L3/4/5, R side, multiple  reps  Lateral hip glide, 2x10, gr III-IV, R/L  Inferior hip glide, gr III-IV, 2x10 R/L  Long axis distraction, x10 R/L  Manual: 20 min  UPA to L3/4/5, R side, multiple reps  Inferior hip glide, gr III-IV, 2x10 R/L  Long axis distraction, x10 R/L  Manual: 15 min  UPA to L3/4/5, R side, multiple reps, gr IV  Central PA'ms to sacral spine multiple bouts, gr IV  Inferior hip glide, gr III-IV, 2x10 R/L Manual: 10 min  Log roll mobilization, R/L, x10 each, gr III Manual: 15 min  Log roll mobilization, R/L, x10 each, gr IV  Central PA's to lumbar spine focused on L4-S2 Manual: 10 min  Log roll mobilization, R/L, x10 each, gr IV  Central PA's to lumbar spine focused on L5-S2                   HEP:   Access Code: [email or web address]  URL: [email or web address]  Date: 08/27/2024  Prepared by: May Sahu    Exercises  - Supine Hamstring Stretch with Strap  - 1 x daily - 7 x weekly - 1-2 sets - 3 reps - 30 sec hold  - Supine Lower Trunk Rotation  - 1 x daily - 7 x weekly - 2 sets - 10 reps  - Gastroc Stretch on Wall  - 1 x daily - 7 x weekly - 1-2 sets - 3 reps - 15 sec hold  - Supine Sciatic Nerve Glide  - 1 x daily - 7 x weekly - 2 sets - 10 reps  - Side Stepping with Resistance at Ankles  - 1 x daily - 7 x weekly - 3 sets - 10 reps  - Cat Cow  - 1 x daily - 7 x weekly - 2 sets - 10 reps    Charges: TE 2 Manual 1       Total Timed Treatment: 40 min  Total Treatment Time: 40 min

## 2024-10-07 ENCOUNTER — OFFICE VISIT (OUTPATIENT)
Dept: PHYSICAL THERAPY | Facility: HOSPITAL | Age: 64
End: 2024-10-07
Attending: Other
Payer: MEDICARE

## 2024-10-07 PROCEDURE — 97140 MANUAL THERAPY 1/> REGIONS: CPT

## 2024-10-07 PROCEDURE — 97110 THERAPEUTIC EXERCISES: CPT

## 2024-10-07 NOTE — PROGRESS NOTES
Diagnosis:   Bilateral sciatica (M54.31,M54.32)      Referring Provider: Ady Penn  Date of Evaluation:     7/19/2024    Precautions:  None Next MD visit:   none scheduled  Date of Surgery: n/a   Insurance Primary/Secondary: MEDICARE / COMMERCIAL     # Auth Visits: 8           Discharge Summary  Pt has attended 11 visits in Physical Therapy.      Subjective: Pt is doing well, back symptoms continue to feel better while driving. Still feels stiff in the morning but not bad.     Pain: no pain at rest      Objective:   Lumbar AROM: (* denotes performed with pain)  Flexion: 65 deg  Extension: 10 deg   Sidebending: R WNL; L WNL  Rotation: R WNL; L WNL     Hip AROM/PROM:   ER: R 35 deg, L 45 deg  IR: 40 deg R, L 40 deg      Strength: (* denotes performed with pain)  LE   Hip flexion (L2): R 5/5; L 5/5  Knee Flexion: R 5/5; L 5/5            Knee extension (L3): R 5/5; L 5/5            DF (L4): R 5/5; L 5/5  Great Toe Ext (L5): R 5/5, L 5/5  PF (S1): R 5/5; L 5/5         Special tests:   Slump: negative   THOMAS: limited R>L  NAHED: negative R/L   SLR: L 60 deg, R 55 deg     Assessment: Pt has attended 11 visits in outpatient physical therapy. In this time, pt has been able to achieve all goals set and reduce low back/leg symptoms while driving. Pt continues to have some stiffness in low back with prolonged sitting or upon waking, educated pt on importance of continuing HEP to maintain low back and hip mobility. Added lumbar rotation stretch to mimic log roll mobilization for increased low back mobility and stretch. Pt to discharge from skilled therapy at this time, continuing at home with HEP.     Goals:   (to be met in 8 visits)   - Pt will improve hamstring length bilaterally by 5 deg or more to reduce pain with prolonged sitting. MET  - Pt will have WFL lumbar joint mobility to improve lumbar flexion AROM. MET.   - Pt will be able to drive 15 min or more without back pain to improve community access. MET   - Updated to  30 minutes or more. MET  - Pt will be independent with HEP to maintain gains made in therapy. MET     LEFS Score  LEFS Score: 51.25 % (7/19/2024  9:02 AM)    Post LEFS Score  Post LEFS Score: 76.25 % (10/7/2024 10:28 AM)    25 % improvement    Plan: Discharge     Patient/Family/Caregiver was advised of these findings, precautions, and treatment options and has agreed to actively participate in planning and for this course of care.    Thank you for your referral. If you have any questions, please contact me at Dept: [phone].    Sincerely,  Electronically signed by therapist: May Sahu PT, DPT    Certification From: 10/7/2024  To:1/5/2025      Date: 8/13/2024              TX#: 5/8 Date: 8/15/2024  Tx#: 6/8 Date: 8/20/2024   Tx#: 7/8 Date: 8/27/2024  Tx#: 8/8 Date: 9/9/2024  Tx#: 9/11 Date: 9/20/2024  Tx#: 10/11 Date: 10/7/2024  Tx#: 11/11   Therex: 25 min  Warm up on bike, level 1, 6 min  DKTC with swiss ball, x20   Bridge on swiss ball, 2x10   Clamshells, x20 R/L   DLHR, x20  Paloff press with blue band, x20 R/L   Shuttle press, #62, 3x10  Therex: 25 min  Warm up on bike, level 1, 6 min  Supine figure 4 stretch, 3x30 sec   Hamstring stretch with strap, 3x30 sec R/L  Bridge on swiss ball, 2x10   Clamshells, x20 R/L  Child's pose stretch, 10x5 sec    Pelvic tilts, x10   Shuttle press, #62, 2x10  Therex: 25 min  Warm up on bike, level 1, 6 min  Bridge on swiss ball, 3x10   SI joint muscle energy, x10 each way, 5 sec holds  Sciatic nerve flossing, 2x10 R/L  Side stepping with yellow band, 4x10'   Shuttle press, #75, 2x10    Therex: 35 min  Warm up on bike, level 1, 6 min  Reassessment in objective  Cat/cows, 2x8  Bridge on swiss ball, 2x10   Shuttle press, #75, 3x10   Side stepping with yellow band, 4x10'  Pt education: updated HEP and how often to complete.  Therex: 30 min  Warm up on bike, level 3, 5 min  Cat/cows, 2x10  Bridge on swiss ball, 2x10   Shuttle press, #75, 3x10   Side stepping with red band,  4x10'  Monster walk with red band, fwd/bkwd, 3 laps  SLHR, x20 R/L   Hamstring stretch with strap, 3x30 sec R/L Therex: 30 min  Warm up on bike, level 2, 5 min  Cat/cow, x10   Shuttle press w/ wobble board, #75, 3x10  Shuttle press SL, #50, 2x10 R/L  Paloff press, #20, x20 R/L   Monster walk with red band, fwd/bkwd, 3 laps x 10'  Kettle bell pass around body, x15, #25  SLHR, x20 R/L   Hamstring stretch with strap, 3x30 sec R/L Therex: 30 min  Warm up on bike, level 2, 5 min  Reassessment above in objective  Hamstring stretch with strap, 3x30 sec R/L  Shuttle press w/ wobble board, #75, 3x10  Shuttle press SL, #50, 2x10 R/L  Supine lumbar rotation stretch, R/L    Manual: 20 min  UPA to L3/4/5, R side, multiple reps  Lateral hip glide, 2x10, gr III-IV, R/L  Inferior hip glide, gr III-IV, 2x10 R/L  Long axis distraction, x10 R/L  Manual: 20 min  UPA to L3/4/5, R side, multiple reps  Inferior hip glide, gr III-IV, 2x10 R/L  Long axis distraction, x10 R/L  Manual: 15 min  UPA to L3/4/5, R side, multiple reps, gr IV  Central PA'ms to sacral spine multiple bouts, gr IV  Inferior hip glide, gr III-IV, 2x10 R/L Manual: 10 min  Log roll mobilization, R/L, x10 each, gr III Manual: 15 min  Log roll mobilization, R/L, x10 each, gr IV  Central PA's to lumbar spine focused on L4-S2 Manual: 10 min  Log roll mobilization, R/L, x10 each, gr IV  Central PA's to lumbar spine focused on L5-S2 Manual: 10 min  Log roll mobilization, R/L, x10 each, gr IV                     HEP:   Access Code: [email or web address]  URL: [email or web address]  Date: 10/07/2024  Prepared by: May Sahu    Exercises  - Supine Hamstring Stretch with Strap  - 1 x daily - 7 x weekly - 1-2 sets - 3 reps - 30 sec hold  - Supine Lower Trunk Rotation  - 1 x daily - 7 x weekly - 2 sets - 10 reps  - Gastroc Stretch on Wall  - 1 x daily - 7 x weekly - 1-2 sets - 3 reps - 15 sec hold  - Supine Sciatic Nerve Glide  - 1 x daily - 7 x weekly - 2 sets - 10 reps  -  Side Stepping with Resistance at Ankles  - 1 x daily - 7 x weekly - 3 sets - 10 reps  - Cat Cow  - 1 x daily - 7 x weekly - 2 sets - 10 reps  - Supine Straight Leg Lumbar Rotation Stretch  - 1 x daily - 7 x weekly - 2 sets - 10 reps    Charges: TE 2 Manual 1       Total Timed Treatment: 40 min  Total Treatment Time: 40 min

## 2025-01-13 RX ORDER — CARBIDOPA AND LEVODOPA 25; 100 MG/1; MG/1
1 TABLET ORAL 3 TIMES DAILY
Qty: 90 TABLET | Refills: 3 | Status: SHIPPED | OUTPATIENT
Start: 2025-01-13

## 2025-01-13 NOTE — TELEPHONE ENCOUNTER
Medication: CARBIDOPA-LEVODOPA  MG Oral Tab      Date of last refill: 08/07/2024 (#90/3)  [place]     Last office visit: 8/7/2024  Due back to clinic per last office note:  3 Months   Date next office visit scheduled:    No future appointments.        Last OV note recommendation:    ASSESSMENT/PLAN:           Encounter Diagnosis   Name Primary?    Parkinson's disease without dyskinesia or fluctuating manifestations (HCC) Yes         No orders of the defined types were placed in this encounter.     Parkinson's disease:  I told him he has Parkinson's disease.  I explained that the diagnosis of clinical diagnosis based off rest tremor, rigidity and bradykinesia and he has all 3.  I discussed the value of a DaTscan with him.Will defer ordering MELANI scan at this stage.   I told him to start carbidopa levodopa [] 1 tablet 3 times daily.  I Explained most common side effect is nausea and upset stomach  I counseled him about etiology, progression and prognosis.   Will follow up in 3 months with Cyndie Herrera  Thank you for allowing us to participate in your patient's care.  eneidaase do not hesitate to call if you have any questions.   I will continue to follow with you and will make further recommendations based on their progress.     60 total minutes spent with patient >50% of visit was spent in counseling and coordination of care        Meds This Visit:  Requested Prescriptions             Signed Prescriptions Disp Refills    carbidopa-levodopa  MG Oral Tab 90 tablet 3       Sig: Take 1 tablet by mouth 3 (three) times daily.         Imaging & Referrals:  None      ID#[id number]      Vascular Nurse Navigator Post Op Phone Call    Post-Discharge phone call attempted to assess patient recovery after vascular surgery I left a message on answering machine. Will attempt to contact at later date.        Pt's chart was reviewed prior to call and leaving message.    Procedure: Left - left arm brachio-basilic AVF creation     Date of Procedure: 12/23/24    Surgeon:    * Scout James,  - Primary     * Marco Morgan PA-C - Assisting        Anticoagulation pt was discharged on post op?: Aspirin and Warfarin (Coumadin or Jantoven)    Statin pt was discharged on post op?:  Lipitor (atorvastatin)    Dialysis Days and Location:  MWF at [facility]    Reminded pt of the following in message:    NEXT SCHEDULED OFFICE VISIT:  1/7/24 at 3:30 pm with PETRA Garcia at [facility]    To contact [facility] with any concerns.